# Patient Record
Sex: FEMALE | Race: BLACK OR AFRICAN AMERICAN | NOT HISPANIC OR LATINO
[De-identification: names, ages, dates, MRNs, and addresses within clinical notes are randomized per-mention and may not be internally consistent; named-entity substitution may affect disease eponyms.]

---

## 2021-02-12 PROBLEM — Z00.00 ENCOUNTER FOR PREVENTIVE HEALTH EXAMINATION: Status: ACTIVE | Noted: 2021-02-12

## 2021-02-23 ENCOUNTER — APPOINTMENT (OUTPATIENT)
Dept: ORTHOPEDIC SURGERY | Facility: CLINIC | Age: 76
End: 2021-02-23
Payer: COMMERCIAL

## 2021-02-23 VITALS
BODY MASS INDEX: 34.53 KG/M2 | OXYGEN SATURATION: 95 % | SYSTOLIC BLOOD PRESSURE: 136 MMHG | DIASTOLIC BLOOD PRESSURE: 80 MMHG | WEIGHT: 220 LBS | HEIGHT: 67 IN | HEART RATE: 96 BPM

## 2021-02-23 DIAGNOSIS — M25.562 PAIN IN RIGHT KNEE: ICD-10-CM

## 2021-02-23 DIAGNOSIS — Z86.39 PERSONAL HISTORY OF OTHER ENDOCRINE, NUTRITIONAL AND METABOLIC DISEASE: ICD-10-CM

## 2021-02-23 DIAGNOSIS — M25.561 PAIN IN RIGHT KNEE: ICD-10-CM

## 2021-02-23 PROCEDURE — 73564 X-RAY EXAM KNEE 4 OR MORE: CPT | Mod: LT

## 2021-02-23 PROCEDURE — 99205 OFFICE O/P NEW HI 60 MIN: CPT

## 2021-02-23 PROCEDURE — 99072 ADDL SUPL MATRL&STAF TM PHE: CPT

## 2021-02-23 RX ORDER — LEVOTHYROXINE SODIUM 100 UG/1
100 TABLET ORAL
Refills: 0 | Status: ACTIVE | COMMUNITY

## 2021-02-23 NOTE — DISCUSSION/SUMMARY
[de-identified] : The patient was informed of her findings and shown her x-rays along with her son who was present during this encounter.  She has end-stage degenerative arthritis bilateral knees.  She was advised that she is clearly a candidate for total knee arthroplasty given the severity of her degenerative changes on bilateral knee x-rays.  She understands her options and given her quality of life issues she is not interested in further anti-inflammatory medications therapy or injection treatments.\par \par Aside from being seen and evaluated for his knee, the patient underwent a lengthy face to face discussion pertaining to total knee arthroplasty. This included instructions and information about the pre-operative preparation as well as information pertaining to the hospitalization and post-operative care and rehabilitation.\par \par The patient was also made aware of the potential risks and possible complications as it pertains to total knee arthroplasty as well as the general surgical and anesthesia risks and complications. This includes, but is not limited to, possible wound infection, cardio-pulmonary problems such as DVT, fat embolism and PE (potentially fatal), soft tissue swelling, stiffness and fibrosis, skin slough, muscle or nerve injury, compartment syndrome, blood transfusion reaction/infection. Problems with the knee components include possible loosening or wearing-out which would require revision surgery.\par \par Patient specific instruments may also be utilized, if applicable, to help achieve more optimal implant alignment, customized to your unique knee anatomy. MRI (Magnetic Resonance Images) and X-Ray images of your affected leg are scanned into an advanced web-based software program, which will generate virtual images of your knee which are read and reviewed and ultimately approved by me. Surgical instruments and guides are then designed and built, mapping out specific bone cuts to accurately align the implants to your knee while performing the surgery. Time under anesthesia may be reduced, which may also lead to less blood loss and a lower risk of infection.\par \par Revision surgery or repeat arthrotomy may also be required for deep infections, and in extreme cases, the implants may be removed either temporarily (staged procedures) or permanently (resulting in knee fusion or even amputation in extreme cases). \par \par The patient understands the above discussion and has been given ample time to ask any other questions or address any concerns pertaining to the purposed surgery. They are also aware that our office staff, specifically our surgical coordinator will continue to be available to guide and instruct the patient during the perioperative phase, as well as answer or relay any other questions that may arise.  The patient will meet with Riri, our surgical coordinator accordingly\par \par Total patient encounter time >55 minutes\par

## 2021-02-23 NOTE — HISTORY OF PRESENT ILLNESS
[Worsening] : worsening [___ yrs] : [unfilled] year(s) ago [8] : a maximum pain level of 8/10 [Constant] : ~He/She~ states the symptoms seem to be constant [Walking] : worsened by walking [Knee Flexion] : worsened with knee flexion [Knee Extension] : worsened with knee extension [de-identified] : Pt presents for initial evaluation with pain in her bilateral knees worse  to the left knee. Pt states there is clicking no buckling. Pt has no known injury, she has taken Tylenol for pain prn with relief. Pt hx reveals arthroscopy to the right knee approx 5-8 years ago, and cortisone injection approx 3 weeks after the right knee surgery to the right knee.  Pt hx reveals Thyroid condition and is taking Synthroid. Pt seen by PCP Krysten Suero who ordered xray of her knees, xrays  2 views bilateral knees, taken [facility] 11/20/2020  Pt weight is 220lbs. [de-identified] : certain movement, ascending and descending stairs [de-identified] : Tylenol, stretching, topical eucalyptus

## 2021-02-23 NOTE — PHYSICAL EXAM
[de-identified] : Physical examination of both knees is essentially identical with limited range of motion from 5 to 95 degrees flexion with terminal tenderness.  Mild varus deformity to the left knee is noted on standing.  Mild effusions are present.  crepitus to the patellofemoral joints bilaterally. [de-identified] : X-rays taken of both knees in AP lateral skyline and open notch views disclose near bone-on-bone to the right knee and complete bone-on-bone joint space narrowing to the left knee.

## 2021-03-25 ENCOUNTER — APPOINTMENT (OUTPATIENT)
Dept: ORTHOPEDIC SURGERY | Facility: CLINIC | Age: 76
End: 2021-03-25
Payer: COMMERCIAL

## 2021-03-25 VITALS
BODY MASS INDEX: 36.1 KG/M2 | WEIGHT: 230 LBS | DIASTOLIC BLOOD PRESSURE: 87 MMHG | HEIGHT: 67 IN | HEART RATE: 67 BPM | SYSTOLIC BLOOD PRESSURE: 142 MMHG | TEMPERATURE: 97.4 F

## 2021-03-25 DIAGNOSIS — Z86.2 PERSONAL HISTORY OF DISEASES OF THE BLOOD AND BLOOD-FORMING ORGANS AND CERTAIN DISORDERS INVOLVING THE IMMUNE MECHANISM: ICD-10-CM

## 2021-03-25 DIAGNOSIS — M17.11 UNILATERAL PRIMARY OSTEOARTHRITIS, RIGHT KNEE: ICD-10-CM

## 2021-03-25 DIAGNOSIS — Z87.39 PERSONAL HISTORY OF OTHER DISEASES OF THE MUSCULOSKELETAL SYSTEM AND CONNECTIVE TISSUE: ICD-10-CM

## 2021-03-25 DIAGNOSIS — Z72.3 LACK OF PHYSICAL EXERCISE: ICD-10-CM

## 2021-03-25 PROCEDURE — 99072 ADDL SUPL MATRL&STAF TM PHE: CPT

## 2021-03-25 PROCEDURE — 99214 OFFICE O/P EST MOD 30 MIN: CPT

## 2021-04-26 ENCOUNTER — TRANSCRIPTION ENCOUNTER (OUTPATIENT)
Age: 76
End: 2021-04-26

## 2021-04-28 ENCOUNTER — NON-APPOINTMENT (OUTPATIENT)
Age: 76
End: 2021-04-28

## 2021-04-28 ENCOUNTER — APPOINTMENT (OUTPATIENT)
Dept: CARDIOLOGY | Facility: CLINIC | Age: 76
End: 2021-04-28
Payer: COMMERCIAL

## 2021-04-28 VITALS
SYSTOLIC BLOOD PRESSURE: 134 MMHG | HEIGHT: 67 IN | BODY MASS INDEX: 36.1 KG/M2 | DIASTOLIC BLOOD PRESSURE: 78 MMHG | TEMPERATURE: 97.3 F | OXYGEN SATURATION: 95 % | WEIGHT: 230 LBS | HEART RATE: 77 BPM

## 2021-04-28 DIAGNOSIS — R06.02 SHORTNESS OF BREATH: ICD-10-CM

## 2021-04-28 PROCEDURE — 93306 TTE W/DOPPLER COMPLETE: CPT

## 2021-04-28 PROCEDURE — 93000 ELECTROCARDIOGRAM COMPLETE: CPT

## 2021-04-28 PROCEDURE — 99204 OFFICE O/P NEW MOD 45 MIN: CPT

## 2021-04-28 PROCEDURE — 99072 ADDL SUPL MATRL&STAF TM PHE: CPT

## 2021-04-28 NOTE — DISCUSSION/SUMMARY
[FreeTextEntry1] : In a summary Aviva Moffett is an elderly female with Abnormal EKG showing anterolateral T wave inversions , shortness of breath on exertion and pre op cardiac evaluation, echo done showed normal LV systolic function and wall motion. Pharmacological nuclear stress test to rule out ischemia. Further plan based on stress test results. Explained Ms. Taylor about stress test. Also called [facility] Nuclear lab and made appointment for May 12 th.

## 2021-04-28 NOTE — HISTORY OF PRESENT ILLNESS
[FreeTextEntry1] : Aviva Moffett is a 75 year old female with history of hypothyroidism and abnormal EKG comes for pre op cardiac evaluation for right knee replacement . Denies any chest pain or palpitations. Mild chronic shortness of breath on exertion which is relieved with rest in few minutes. Can not assess functional capacity as she can not walk much because of joint pains.. Walks with Cane.

## 2021-04-28 NOTE — REVIEW OF SYSTEMS
[Dyspnea on exertion] : dyspnea during exertion [Joint Pain] : joint pain [Negative] : Psychiatric [Chest Discomfort] : no chest discomfort [Lower Ext Edema] : no extremity edema [Leg Claudication] : no intermittent leg claudication [Palpitations] : no palpitations [Orthopnea] : no orthopnea [PND] : no PND [Syncope] : no syncope [Easy Bleeding] : no tendency for easy bleeding [Easy Bruising] : no tendency for easy bruising

## 2021-04-28 NOTE — PHYSICAL EXAM
[No Carotid Bruit] : no carotid bruit [No Edema] : no edema [No Cyanosis] : no cyanosis [No Clubbing] : no clubbing [Normal] : alert and oriented, normal memory [de-identified] : walks with cane.

## 2021-05-05 ENCOUNTER — OUTPATIENT (OUTPATIENT)
Dept: OUTPATIENT SERVICES | Facility: HOSPITAL | Age: 76
LOS: 1 days | End: 2021-05-05
Payer: COMMERCIAL

## 2021-05-05 VITALS
RESPIRATION RATE: 16 BRPM | SYSTOLIC BLOOD PRESSURE: 144 MMHG | WEIGHT: 225.09 LBS | TEMPERATURE: 97 F | OXYGEN SATURATION: 96 % | HEIGHT: 66 IN | DIASTOLIC BLOOD PRESSURE: 86 MMHG | HEART RATE: 74 BPM

## 2021-05-05 DIAGNOSIS — M17.11 UNILATERAL PRIMARY OSTEOARTHRITIS, RIGHT KNEE: ICD-10-CM

## 2021-05-05 DIAGNOSIS — Z98.890 OTHER SPECIFIED POSTPROCEDURAL STATES: Chronic | ICD-10-CM

## 2021-05-05 DIAGNOSIS — Z01.818 ENCOUNTER FOR OTHER PREPROCEDURAL EXAMINATION: ICD-10-CM

## 2021-05-05 LAB
ALBUMIN SERPL ELPH-MCNC: 3.7 G/DL — SIGNIFICANT CHANGE UP (ref 3.3–5)
ALP SERPL-CCNC: 63 U/L — SIGNIFICANT CHANGE UP (ref 30–120)
ALT FLD-CCNC: 28 U/L DA — SIGNIFICANT CHANGE UP (ref 10–60)
ANION GAP SERPL CALC-SCNC: 7 MMOL/L — SIGNIFICANT CHANGE UP (ref 5–17)
APTT BLD: 33.9 SEC — SIGNIFICANT CHANGE UP (ref 27.5–35.5)
AST SERPL-CCNC: 22 U/L — SIGNIFICANT CHANGE UP (ref 10–40)
BILIRUB SERPL-MCNC: 0.4 MG/DL — SIGNIFICANT CHANGE UP (ref 0.2–1.2)
BLD GP AB SCN SERPL QL: SIGNIFICANT CHANGE UP
BUN SERPL-MCNC: 16 MG/DL — SIGNIFICANT CHANGE UP (ref 7–23)
CALCIUM SERPL-MCNC: 9.1 MG/DL — SIGNIFICANT CHANGE UP (ref 8.4–10.5)
CHLORIDE SERPL-SCNC: 106 MMOL/L — SIGNIFICANT CHANGE UP (ref 96–108)
CO2 SERPL-SCNC: 29 MMOL/L — SIGNIFICANT CHANGE UP (ref 22–31)
CREAT SERPL-MCNC: 1.1 MG/DL — SIGNIFICANT CHANGE UP (ref 0.5–1.3)
GLUCOSE SERPL-MCNC: 92 MG/DL — SIGNIFICANT CHANGE UP (ref 70–99)
HCT VFR BLD CALC: 38.9 % — SIGNIFICANT CHANGE UP (ref 34.5–45)
HGB BLD-MCNC: 12.8 G/DL — SIGNIFICANT CHANGE UP (ref 11.5–15.5)
INR BLD: 1.03 RATIO — SIGNIFICANT CHANGE UP (ref 0.88–1.16)
MCHC RBC-ENTMCNC: 29.2 PG — SIGNIFICANT CHANGE UP (ref 27–34)
MCHC RBC-ENTMCNC: 32.9 GM/DL — SIGNIFICANT CHANGE UP (ref 32–36)
MCV RBC AUTO: 88.8 FL — SIGNIFICANT CHANGE UP (ref 80–100)
NRBC # BLD: 0 /100 WBCS — SIGNIFICANT CHANGE UP (ref 0–0)
PLATELET # BLD AUTO: 190 K/UL — SIGNIFICANT CHANGE UP (ref 150–400)
POTASSIUM SERPL-MCNC: 4.2 MMOL/L — SIGNIFICANT CHANGE UP (ref 3.5–5.3)
POTASSIUM SERPL-SCNC: 4.2 MMOL/L — SIGNIFICANT CHANGE UP (ref 3.5–5.3)
PROT SERPL-MCNC: 7.5 G/DL — SIGNIFICANT CHANGE UP (ref 6–8.3)
PROTHROM AB SERPL-ACNC: 12.5 SEC — SIGNIFICANT CHANGE UP (ref 10.6–13.6)
RBC # BLD: 4.38 M/UL — SIGNIFICANT CHANGE UP (ref 3.8–5.2)
RBC # FLD: 13.4 % — SIGNIFICANT CHANGE UP (ref 10.3–14.5)
SODIUM SERPL-SCNC: 142 MMOL/L — SIGNIFICANT CHANGE UP (ref 135–145)
WBC # BLD: 5.16 K/UL — SIGNIFICANT CHANGE UP (ref 3.8–10.5)
WBC # FLD AUTO: 5.16 K/UL — SIGNIFICANT CHANGE UP (ref 3.8–10.5)

## 2021-05-05 PROCEDURE — G0463: CPT

## 2021-05-05 NOTE — H&P PST ADULT - NSICDXFAMILYHX_GEN_ALL_CORE_FT
FAMILY HISTORY:  Father  Still living? No  Family history of cardiac arrest, Age at diagnosis: Age Unknown

## 2021-05-05 NOTE — H&P PST ADULT - NSICDXPASTMEDICALHX_GEN_ALL_CORE_FT
PAST MEDICAL HISTORY:  Class 2 obesity with body mass index (BMI) of 36.0 to 36.9 in adult     COVID-19 vaccine series completed 3/21    Hypothyroidism     Osteoarthritis of right knee     Sickle cell trait

## 2021-05-05 NOTE — H&P PST ADULT - MUSCULOSKELETAL
detailed exam details… no joint erythema/no joint warmth/no calf tenderness/decreased ROM due to pain/joint swelling

## 2021-05-05 NOTE — H&P PST ADULT - NSANTHOSAYNRD_GEN_A_CORE
No. HUNTER screening performed.  STOP BANG Legend: 0-2 = LOW Risk; 3-4 = INTERMEDIATE Risk; 5-8 = HIGH Risk

## 2021-05-05 NOTE — H&P PST ADULT - HISTORY OF PRESENT ILLNESS
76 yo female reports right knee pain which is constant rating 10/10.   She is scheduled for right total knee replacement on 5/19/21 at [facility].

## 2021-05-05 NOTE — H&P PST ADULT - NSICDXPASTSURGICALHX_GEN_ALL_CORE_FT
PAST SURGICAL HISTORY:  History of arthroscopy of knee right, 2010    History of lithotripsy 2000

## 2021-05-05 NOTE — H&P PST ADULT - NSICDXPROBLEM_GEN_ALL_CORE_FT
PROBLEM DIAGNOSES  Problem: Osteoarthritis of right knee  Assessment and Plan: Right total knee replacement is planned for 5/19/2021.  Diagnostic testing performed; medical and cardiac clearances requested. COVID PCR testing is scheduled for 5/16/2021 @ [facility].  pre op instructions were reviewed and signed.  Contact info given; best wishes offered.

## 2021-05-06 LAB
MRSA PCR RESULT.: SIGNIFICANT CHANGE UP
S AUREUS DNA NOSE QL NAA+PROBE: SIGNIFICANT CHANGE UP

## 2021-05-12 ENCOUNTER — OUTPATIENT (OUTPATIENT)
Dept: OUTPATIENT SERVICES | Facility: HOSPITAL | Age: 76
LOS: 1 days | End: 2021-05-12

## 2021-05-12 ENCOUNTER — APPOINTMENT (OUTPATIENT)
Dept: CV DIAGNOSTICS | Facility: HOSPITAL | Age: 76
End: 2021-05-12
Payer: COMMERCIAL

## 2021-05-12 DIAGNOSIS — Z98.890 OTHER SPECIFIED POSTPROCEDURAL STATES: Chronic | ICD-10-CM

## 2021-05-12 DIAGNOSIS — Z01.810 ENCOUNTER FOR PREPROCEDURAL CARDIOVASCULAR EXAMINATION: ICD-10-CM

## 2021-05-12 PROBLEM — E03.9 HYPOTHYROIDISM, UNSPECIFIED: Chronic | Status: ACTIVE | Noted: 2021-05-05

## 2021-05-12 PROBLEM — E66.9 OBESITY, UNSPECIFIED: Chronic | Status: ACTIVE | Noted: 2021-05-05

## 2021-05-12 PROBLEM — D57.3 SICKLE-CELL TRAIT: Chronic | Status: ACTIVE | Noted: 2021-05-05

## 2021-05-12 PROCEDURE — 93018 CV STRESS TEST I&R ONLY: CPT | Mod: GC

## 2021-05-12 PROCEDURE — 93016 CV STRESS TEST SUPVJ ONLY: CPT | Mod: GC

## 2021-05-12 PROCEDURE — 78452 HT MUSCLE IMAGE SPECT MULT: CPT | Mod: 26

## 2021-05-13 ENCOUNTER — APPOINTMENT (OUTPATIENT)
Dept: ORTHOPEDIC SURGERY | Facility: HOSPITAL | Age: 76
End: 2021-05-13

## 2021-05-14 NOTE — H&P PST ADULT - BP NONINVASIVE DIASTOLIC (MM HG)
You can access the FollowMyHealth Patient Portal offered by [facility] by registering at the following website: [email or web address]. By joining [employer]’s FollowMyHealth portal, you will also be able to view your health information using other applications (apps) compatible with our system.
86

## 2021-05-16 ENCOUNTER — APPOINTMENT (OUTPATIENT)
Dept: DISASTER EMERGENCY | Facility: CLINIC | Age: 76
End: 2021-05-16

## 2021-05-16 DIAGNOSIS — Z01.818 ENCOUNTER FOR OTHER PREPROCEDURAL EXAMINATION: ICD-10-CM

## 2021-05-17 ENCOUNTER — NON-APPOINTMENT (OUTPATIENT)
Age: 76
End: 2021-05-17

## 2021-05-17 LAB — SARS-COV-2 N GENE NPH QL NAA+PROBE: NOT DETECTED

## 2021-05-18 ENCOUNTER — FORM ENCOUNTER (OUTPATIENT)
Age: 76
End: 2021-05-18

## 2021-05-19 ENCOUNTER — INPATIENT (INPATIENT)
Facility: HOSPITAL | Age: 76
LOS: 0 days | Discharge: HOME | DRG: 470 | End: 2021-05-20
Attending: ORTHOPAEDIC SURGERY | Admitting: ORTHOPAEDIC SURGERY
Payer: MEDICARE

## 2021-05-19 ENCOUNTER — RESULT REVIEW (OUTPATIENT)
Age: 76
End: 2021-05-19

## 2021-05-19 ENCOUNTER — TRANSCRIPTION ENCOUNTER (OUTPATIENT)
Age: 76
End: 2021-05-19

## 2021-05-19 ENCOUNTER — APPOINTMENT (OUTPATIENT)
Dept: ORTHOPEDIC SURGERY | Facility: HOSPITAL | Age: 76
End: 2021-05-19

## 2021-05-19 VITALS
TEMPERATURE: 98 F | WEIGHT: 227.96 LBS | DIASTOLIC BLOOD PRESSURE: 80 MMHG | OXYGEN SATURATION: 98 % | HEART RATE: 79 BPM | HEIGHT: 67 IN | RESPIRATION RATE: 11 BRPM | SYSTOLIC BLOOD PRESSURE: 149 MMHG

## 2021-05-19 DIAGNOSIS — Z98.890 OTHER SPECIFIED POSTPROCEDURAL STATES: Chronic | ICD-10-CM

## 2021-05-19 DIAGNOSIS — M17.11 UNILATERAL PRIMARY OSTEOARTHRITIS, RIGHT KNEE: ICD-10-CM

## 2021-05-19 LAB
ANION GAP SERPL CALC-SCNC: 7 MMOL/L — SIGNIFICANT CHANGE UP (ref 5–17)
BUN SERPL-MCNC: 14 MG/DL — SIGNIFICANT CHANGE UP (ref 7–23)
CALCIUM SERPL-MCNC: 8.6 MG/DL — SIGNIFICANT CHANGE UP (ref 8.4–10.5)
CHLORIDE SERPL-SCNC: 105 MMOL/L — SIGNIFICANT CHANGE UP (ref 96–108)
CO2 SERPL-SCNC: 27 MMOL/L — SIGNIFICANT CHANGE UP (ref 22–31)
CREAT SERPL-MCNC: 1.23 MG/DL — SIGNIFICANT CHANGE UP (ref 0.5–1.3)
GLUCOSE SERPL-MCNC: 161 MG/DL — HIGH (ref 70–99)
HCT VFR BLD CALC: 34.8 % — SIGNIFICANT CHANGE UP (ref 34.5–45)
HGB BLD-MCNC: 11.5 G/DL — SIGNIFICANT CHANGE UP (ref 11.5–15.5)
MCHC RBC-ENTMCNC: 29.3 PG — SIGNIFICANT CHANGE UP (ref 27–34)
MCHC RBC-ENTMCNC: 33 GM/DL — SIGNIFICANT CHANGE UP (ref 32–36)
MCV RBC AUTO: 88.8 FL — SIGNIFICANT CHANGE UP (ref 80–100)
NRBC # BLD: 0 /100 WBCS — SIGNIFICANT CHANGE UP (ref 0–0)
PLATELET # BLD AUTO: 167 K/UL — SIGNIFICANT CHANGE UP (ref 150–400)
POTASSIUM SERPL-MCNC: 4.4 MMOL/L — SIGNIFICANT CHANGE UP (ref 3.5–5.3)
POTASSIUM SERPL-SCNC: 4.4 MMOL/L — SIGNIFICANT CHANGE UP (ref 3.5–5.3)
RBC # BLD: 3.92 M/UL — SIGNIFICANT CHANGE UP (ref 3.8–5.2)
RBC # FLD: 13.7 % — SIGNIFICANT CHANGE UP (ref 10.3–14.5)
SODIUM SERPL-SCNC: 139 MMOL/L — SIGNIFICANT CHANGE UP (ref 135–145)
WBC # BLD: 6.13 K/UL — SIGNIFICANT CHANGE UP (ref 3.8–10.5)
WBC # FLD AUTO: 6.13 K/UL — SIGNIFICANT CHANGE UP (ref 3.8–10.5)

## 2021-05-19 PROCEDURE — 73560 X-RAY EXAM OF KNEE 1 OR 2: CPT | Mod: 26,RT

## 2021-05-19 PROCEDURE — 88305 TISSUE EXAM BY PATHOLOGIST: CPT | Mod: 26

## 2021-05-19 PROCEDURE — 99223 1ST HOSP IP/OBS HIGH 75: CPT

## 2021-05-19 PROCEDURE — 88311 DECALCIFY TISSUE: CPT | Mod: 26

## 2021-05-19 PROCEDURE — 27447 TOTAL KNEE ARTHROPLASTY: CPT | Mod: RT

## 2021-05-19 RX ORDER — SODIUM CHLORIDE 9 MG/ML
1000 INJECTION, SOLUTION INTRAVENOUS
Refills: 0 | Status: DISCONTINUED | OUTPATIENT
Start: 2021-05-19 | End: 2021-05-19

## 2021-05-19 RX ORDER — OXYCODONE HYDROCHLORIDE 5 MG/1
5 TABLET ORAL
Refills: 0 | Status: DISCONTINUED | OUTPATIENT
Start: 2021-05-19 | End: 2021-05-20

## 2021-05-19 RX ORDER — ACETAMINOPHEN 500 MG
1000 TABLET ORAL ONCE
Refills: 0 | Status: COMPLETED | OUTPATIENT
Start: 2021-05-19 | End: 2021-05-19

## 2021-05-19 RX ORDER — TRANEXAMIC ACID 100 MG/ML
1000 INJECTION, SOLUTION INTRAVENOUS ONCE
Refills: 0 | Status: COMPLETED | OUTPATIENT
Start: 2021-05-19 | End: 2021-05-19

## 2021-05-19 RX ORDER — SODIUM CHLORIDE 9 MG/ML
500 INJECTION, SOLUTION INTRAVENOUS ONCE
Refills: 0 | Status: COMPLETED | OUTPATIENT
Start: 2021-05-19 | End: 2021-05-19

## 2021-05-19 RX ORDER — ASPIRIN/CALCIUM CARB/MAGNESIUM 324 MG
1 TABLET ORAL
Qty: 83 | Refills: 0
Start: 2021-05-19 | End: 2021-06-29

## 2021-05-19 RX ORDER — LEVOTHYROXINE SODIUM 125 MCG
100 TABLET ORAL DAILY
Refills: 0 | Status: DISCONTINUED | OUTPATIENT
Start: 2021-05-19 | End: 2021-05-20

## 2021-05-19 RX ORDER — ONDANSETRON 8 MG/1
4 TABLET, FILM COATED ORAL ONCE
Refills: 0 | Status: DISCONTINUED | OUTPATIENT
Start: 2021-05-19 | End: 2021-05-19

## 2021-05-19 RX ORDER — ACETAMINOPHEN 500 MG
1000 TABLET ORAL ONCE
Refills: 0 | Status: COMPLETED | OUTPATIENT
Start: 2021-05-20 | End: 2021-05-20

## 2021-05-19 RX ORDER — ACETAMINOPHEN 500 MG
1000 TABLET ORAL EVERY 8 HOURS
Refills: 0 | Status: DISCONTINUED | OUTPATIENT
Start: 2021-05-20 | End: 2021-05-20

## 2021-05-19 RX ORDER — CEFAZOLIN SODIUM 1 G
2000 VIAL (EA) INJECTION ONCE
Refills: 0 | Status: COMPLETED | OUTPATIENT
Start: 2021-05-19 | End: 2021-05-19

## 2021-05-19 RX ORDER — OMEPRAZOLE 10 MG/1
1 CAPSULE, DELAYED RELEASE ORAL
Qty: 30 | Refills: 1
Start: 2021-05-19 | End: 2021-07-17

## 2021-05-19 RX ORDER — HYDROMORPHONE HYDROCHLORIDE 2 MG/ML
0.5 INJECTION INTRAMUSCULAR; INTRAVENOUS; SUBCUTANEOUS
Refills: 0 | Status: DISCONTINUED | OUTPATIENT
Start: 2021-05-19 | End: 2021-05-20

## 2021-05-19 RX ORDER — ASPIRIN/CALCIUM CARB/MAGNESIUM 324 MG
81 TABLET ORAL
Refills: 0 | Status: DISCONTINUED | OUTPATIENT
Start: 2021-05-20 | End: 2021-05-20

## 2021-05-19 RX ORDER — PANTOPRAZOLE SODIUM 20 MG/1
40 TABLET, DELAYED RELEASE ORAL
Refills: 0 | Status: DISCONTINUED | OUTPATIENT
Start: 2021-05-19 | End: 2021-05-20

## 2021-05-19 RX ORDER — CELECOXIB 200 MG/1
1 CAPSULE ORAL
Qty: 60 | Refills: 0
Start: 2021-05-19 | End: 2021-06-17

## 2021-05-19 RX ORDER — SODIUM CHLORIDE 9 MG/ML
500 INJECTION INTRAMUSCULAR; INTRAVENOUS; SUBCUTANEOUS ONCE
Refills: 0 | Status: COMPLETED | OUTPATIENT
Start: 2021-05-19 | End: 2021-05-19

## 2021-05-19 RX ORDER — CELECOXIB 200 MG/1
200 CAPSULE ORAL EVERY 12 HOURS
Refills: 0 | Status: DISCONTINUED | OUTPATIENT
Start: 2021-05-20 | End: 2021-05-20

## 2021-05-19 RX ORDER — CEFAZOLIN SODIUM 1 G
2000 VIAL (EA) INJECTION EVERY 8 HOURS
Refills: 0 | Status: COMPLETED | OUTPATIENT
Start: 2021-05-19 | End: 2021-05-20

## 2021-05-19 RX ORDER — MAGNESIUM HYDROXIDE 400 MG/1
30 TABLET, CHEWABLE ORAL DAILY
Refills: 0 | Status: DISCONTINUED | OUTPATIENT
Start: 2021-05-19 | End: 2021-05-20

## 2021-05-19 RX ORDER — SODIUM CHLORIDE 9 MG/ML
1000 INJECTION, SOLUTION INTRAVENOUS
Refills: 0 | Status: DISCONTINUED | OUTPATIENT
Start: 2021-05-20 | End: 2021-05-20

## 2021-05-19 RX ORDER — ONDANSETRON 8 MG/1
4 TABLET, FILM COATED ORAL EVERY 6 HOURS
Refills: 0 | Status: DISCONTINUED | OUTPATIENT
Start: 2021-05-19 | End: 2021-05-20

## 2021-05-19 RX ORDER — OXYCODONE HYDROCHLORIDE 5 MG/1
10 TABLET ORAL
Refills: 0 | Status: DISCONTINUED | OUTPATIENT
Start: 2021-05-19 | End: 2021-05-20

## 2021-05-19 RX ORDER — APREPITANT 80 MG/1
40 CAPSULE ORAL ONCE
Refills: 0 | Status: COMPLETED | OUTPATIENT
Start: 2021-05-19 | End: 2021-05-19

## 2021-05-19 RX ORDER — HYDROMORPHONE HYDROCHLORIDE 2 MG/ML
0.5 INJECTION INTRAMUSCULAR; INTRAVENOUS; SUBCUTANEOUS
Refills: 0 | Status: DISCONTINUED | OUTPATIENT
Start: 2021-05-19 | End: 2021-05-19

## 2021-05-19 RX ORDER — CHLORHEXIDINE GLUCONATE 213 G/1000ML
1 SOLUTION TOPICAL ONCE
Refills: 0 | Status: COMPLETED | OUTPATIENT
Start: 2021-05-19 | End: 2021-05-19

## 2021-05-19 RX ADMIN — CHLORHEXIDINE GLUCONATE 1 APPLICATION(S): 213 SOLUTION TOPICAL at 06:49

## 2021-05-19 RX ADMIN — SODIUM CHLORIDE 100 MILLILITER(S): 9 INJECTION, SOLUTION INTRAVENOUS at 12:17

## 2021-05-19 RX ADMIN — Medication 1000 MILLIGRAM(S): at 23:14

## 2021-05-19 RX ADMIN — Medication 400 MILLIGRAM(S): at 17:09

## 2021-05-19 RX ADMIN — SODIUM CHLORIDE 500 MILLILITER(S): 9 INJECTION INTRAMUSCULAR; INTRAVENOUS; SUBCUTANEOUS at 12:00

## 2021-05-19 RX ADMIN — Medication 1000 MILLIGRAM(S): at 17:09

## 2021-05-19 RX ADMIN — Medication 400 MILLIGRAM(S): at 23:14

## 2021-05-19 RX ADMIN — Medication 1000 MILLIGRAM(S): at 12:20

## 2021-05-19 RX ADMIN — OXYCODONE HYDROCHLORIDE 5 MILLIGRAM(S): 5 TABLET ORAL at 13:57

## 2021-05-19 RX ADMIN — Medication 100 MILLIGRAM(S): at 16:52

## 2021-05-19 RX ADMIN — APREPITANT 40 MILLIGRAM(S): 80 CAPSULE ORAL at 06:49

## 2021-05-19 RX ADMIN — SODIUM CHLORIDE 500 MILLILITER(S): 9 INJECTION, SOLUTION INTRAVENOUS at 14:02

## 2021-05-19 RX ADMIN — Medication 400 MILLIGRAM(S): at 11:50

## 2021-05-19 RX ADMIN — OXYCODONE HYDROCHLORIDE 5 MILLIGRAM(S): 5 TABLET ORAL at 14:30

## 2021-05-19 NOTE — PHYSICAL THERAPY INITIAL EVALUATION ADULT - AMBULATION SKILLS, REHAB EVAL
Referral for Behavioral Health/Glenna Méndez has been placed.   Patient will be contacted by [facility] to schedule.  
straight cane/independent/needs device

## 2021-05-19 NOTE — DISCHARGE NOTE NURSING/CASE MANAGEMENT/SOCIAL WORK - PATIENT PORTAL LINK FT
You can access the FollowMyHealth Patient Portal offered by [facility] by registering at the following website: [email or web address]. By joining [employer]’s FollowMyHealth portal, you will also be able to view your health information using other applications (apps) compatible with our system.

## 2021-05-19 NOTE — PHYSICAL THERAPY INITIAL EVALUATION ADULT - RANGE OF MOTION EXAMINATION, REHAB EVAL
Right knee flexion gross 60 degrees/bilateral upper extremity ROM was WFL (within functional limits)/Left LE ROM was WFL (within functional limits)/deficits as listed below

## 2021-05-19 NOTE — CONSULT NOTE ADULT - ASSESSMENT
POD#0 s/p RIGHT TKR  - Pain control  - Bowel regimen  - PT/OT  - Incentive spirometry  - VTE PPx - ASA BID    Abnormal pre-op EKG  - cardio clearance reviewed  - had nuclear stress test on 5/12/21, normal study, no ischemia    Hypothyroidism  - continue synthroid    Obesity  - diet/portion control  - exercise program once fully rehabbed from current procedure    Sickle Cell Train  - not anemic at baseline

## 2021-05-19 NOTE — DISCHARGE NOTE NURSING/CASE MANAGEMENT/SOCIAL WORK - NSSCNAMETXT_GEN_ALL_CORE
[facility] [place] - [phone] or [phone]  Nurse to visit the day after hospital discharge; physical therapist to follow. Please contact the home care agency if you have not heard from them by 12 noon on the day after your hospital discharge.   [facility]  PHYSICAL THERAPIST to visit the day after hospital discharge; Nurse to follow during week one. Please contact the home care agency if you have not heard from them by 12 noon on the day after your hospital discharge.

## 2021-05-19 NOTE — DISCHARGE NOTE PROVIDER - NSDCMRMEDTOKEN_GEN_ALL_CORE_FT
aspirin 81 mg oral delayed release tablet: 1 tab orally every 12 hours. Take 2 hours before Celebrex.   celecoxib 200 mg oral capsule: 1 cap orally every 12 hours. Take 2 hours after Aspirin.  omeprazole 20 mg oral delayed release capsule: 1 cap orally once a day   Synthroid 100 mcg (0.1 mg) oral tablet: 1 tab(s) orally once a day   acetaminophen 500 mg oral tablet: 2 tab(s) orally every 8 hours  aspirin 81 mg oral delayed release tablet: 1 tab orally every 12 hours. Take 2 hours before Celebrex.   celecoxib 200 mg oral capsule: 1 cap orally every 12 hours. Take 2 hours after Aspirin.  omeprazole 20 mg oral delayed release capsule: 1 cap orally once a day   oxyCODONE 5 mg oral tablet: 1-2 tab(s) orally every 4 hours, As Needed -for severe pain MDD:6  Synthroid 100 mcg (0.1 mg) oral tablet: 1 tab(s) orally once a day

## 2021-05-19 NOTE — OCCUPATIONAL THERAPY INITIAL EVALUATION ADULT - ADDITIONAL COMMENTS
Patient lives in private home with  and son. 3 LAUREN. Patient owns RW, RTS. +tub with grab bars. Patient used a SAC prior to surgery, and was independent with ADLs.

## 2021-05-19 NOTE — DISCHARGE NOTE PROVIDER - CARE PROVIDER_API CALL
Reji Robert)  Orthopaedic Surgery  [address], [address]  [place] [address]  Phone: [phone]  Fax: [phone]  Established Patient  Scheduled Appointment: 06/03/2021 09:30 AM

## 2021-05-19 NOTE — DISCHARGE NOTE PROVIDER - NSDCCPCAREPLAN_GEN_ALL_CORE_FT
PRINCIPAL DISCHARGE DIAGNOSIS  Diagnosis: Primary osteoarthritis of right knee  Assessment and Plan of Treatment: For your total knee replacement:  Physical Therapy/Occupational Therapy for: ambulation, transfers, stairs, ADL's (activities of daily living), range of motion exercises, and isometrics  -Activity  • Weight Bearing as tolerated with rolling walker.  • Ice 20 minutes several times daily with at least a 20 minute break in between icing sessions  • Take short, frequent walks increasing the distance that you walk each day as tolerated.  • Change your position every hour to decrease pain and stiffness.  • Continue the exercises taught to you by your physical therapist.  • No driving until cleared by the doctor.  • No tub baths, hot tubs, or swimming pools until instructed by your doctor.  • Do not squat down on the floor.  • Do not kneel or twist your knee.  • Range of Motion Goals: Flexion= 120 degrees, Extension = 0 degrees  Daily dressing changes if incision is not completely dry.  If inicision is dry, it may be left open to air.  Keep incision clean. DO NOT APPLY ANYTHING to incision site (salves/ointments/creams).  May shower post-op if no drainage from incision.  Do not scrub incision site. Pat dry after shower.  Suture removal 2 weeks after surgery at Surgeon's office.

## 2021-05-19 NOTE — PHYSICAL THERAPY INITIAL EVALUATION ADULT - ADDITIONAL COMMENTS
Pt lives in a house w/  and son w/ 3 w/o a handrail steps to enter and no steps to the bedroom. Pt reports owning a straight cane and rolling walker.

## 2021-05-19 NOTE — DISCHARGE NOTE PROVIDER - PROVIDER TOKENS
PROVIDER:[TOKEN:[[id number]],SCHEDULEDAPPT:[06/03/2021],SCHEDULEDAPPTTIME:[09:30 AM],ESTABLISHEDPATIENT:[T]]

## 2021-05-19 NOTE — PROGRESS NOTE ADULT - SUBJECTIVE AND OBJECTIVE BOX
Ortho PA - Post Op Check - S/P Right TKR with adductor canal nerve block and general anesthesia           Pt alert and c/o moderate pain in upper right thigh.  Denies nausea.    Vital Signs Last 24 Hrs  T(C): 36.8 (05-19-21 @ 11:40), Max: 36.8 (05-19-21 @ 11:40)  T(F): 98.2 (05-19-21 @ 11:40), Max: 98.2 (05-19-21 @ 11:40)  HR: 68 (05-19-21 @ 13:24) (62 - 79)  BP: 120/64 (05-19-21 @ 13:24) (98/58 - 141/59)  BP(mean): --  RR: 18 (05-19-21 @ 13:24) (10 - 18)  SpO2: 95% (05-19-21 @ 13:24) (95% - 100%)  I&O's Detail    19 May 2021 07:01  -  19 May 2021 14:30  --------------------------------------------------------  IN:    IV PiggyBack: 100 mL    Lactated Ringers: 1500 mL    Lactated Ringers: 100 mL    Sodium Chloride 0.9% Bolus: 500 mL  Total IN: 2200 mL    OUT:    Estimated Blood Loss (mL): 75 mL in OR    Voided (mL): 400 mL  Total OUT: 475 mL    Total NET: 1725 mL        I&O's Summary    19 May 2021 07:01  -  19 May 2021 14:30  --------------------------------------------------------  IN: 2200 mL / OUT: 475 mL / NET: 1725 mL                     MEDICATIONS:acetaminophen  IVPB .. 1000 milliGRAM(s) IV Intermittent once  acetaminophen  IVPB .. 1000 milliGRAM(s) IV Intermittent once  ceFAZolin   IVPB 2000 milliGRAM(s) IV Intermittent every 8 hours  HYDROmorphone  Injectable 0.5 milliGRAM(s) IV Push every 3 hours PRN  levothyroxine 100 MICROGram(s) Oral daily  magnesium hydroxide Suspension 30 milliLiter(s) Oral daily PRN  ondansetron Injectable 4 milliGRAM(s) IV Push every 6 hours PRN  oxyCODONE    IR 5 milliGRAM(s) Oral every 3 hours PRN  oxyCODONE    IR 10 milliGRAM(s) Oral every 3 hours PRN  pantoprazole    Tablet 40 milliGRAM(s) Oral before breakfast    Anticoagulation: PAS to LE's      Antibiotics:   ceFAZolin   IVPB 2000 milliGRAM(s) IV Intermittent every 8 hours for 2 more doses postop      Pain medications:   acetaminophen  IVPB .. 1000 milliGRAM(s) IV Intermittent once  acetaminophen  IVPB .. 1000 milliGRAM(s) IV Intermittent once  HYDROmorphone  Injectable 0.5 milliGRAM(s) IV Push every 3 hours PRN  ondansetron Injectable 4 milliGRAM(s) IV Push every 6 hours PRN  oxyCODONE    IR 5 milliGRAM(s) Oral every 3 hours PRN  oxyCODONE    IR 10 milliGRAM(s) Oral every 3 hours PRN          PE:  Right Knee-primary surgical bandage dry and intact.   Feet mobile and sensate.  *EHLs/ant.tibs. 5/5. DP pulse 1+.  PAS on LE's.  Calves soft and nontender.    A/P: Ortho stable post-op  - Continue post-op orders; pain management with above plan  - Check labs today and in A.M.  - DVT prevention with 6 weeks of Aspirin starting tomorrow.  - PT /OT for OOB, full WBAT  - Medical consult with Dr. Mccoy appreciated.  -Discharge planning for Home with home care services tomorrow.  -Will continue to monitor closely with attendings.

## 2021-05-19 NOTE — DISCHARGE NOTE PROVIDER - HOSPITAL COURSE
This patient was admitted to [facility] with a history of severe degenerative joint disease of the right knee.  Patient underwent Pre-Surgical Testing and was medically cleared to undergo elective procedure. Patient underwent right TKR by Dr. Reji Robert on 5/19/21. Procedure was well tolerated.  No operative or ramandeep-operative complications arose during patient's hospital course.  Patient received antibiotic according to SCIP guidelines for infection prevention.  Aspirin 81mg q 12h was given for DVT prophylaxis, in addition to the use of SCDs.  Anesthesia, Medical Hospitalist, Physical Therapy and Occupational Therapy were consulted. Patient is stable for discharge with a good prognosis.  Appropriate discharge instructions and medications are provided in this document.

## 2021-05-19 NOTE — CONSULT NOTE ADULT - SUBJECTIVE AND OBJECTIVE BOX
Information Obtained from:  EHR, Physical Chart, Patient at bedside (relevant EHR and Chart information verified with patient)    HPI:  74yo F admitted s/p RIGHT TKR today.  Has had worsening right knee pain, constant, up to 10/10, dull, achy, non-radiating, worse with activity, better with weight offloading, affecting quality of life.     REVIEW OF SYSTEMS:  CONSTITUTIONAL: No fever, weight loss, or fatigue  EYES: No eye pain, visual disturbances, or discharge  ENMT:  No difficulty hearing, tinnitus, vertigo; No sinus or throat pain  NECK: No pain or stiffness  BREASTS: No pain, masses, or nipple discharge  RESPIRATORY: No cough, wheezing, chills or hemoptysis; No shortness of breath  CARDIOVASCULAR: No chest pain, palpitations, dizziness, or leg swelling  GASTROINTESTINAL: No abdominal or epigastric pain. No nausea, vomiting, or hematemesis; No diarrhea or constipation. No melena or hematochezia.  GENITOURINARY: No dysuria, frequency, hematuria, or incontinence  NEUROLOGICAL: No headaches, memory loss, or tremors  SKIN: No itching, burning, rashes, or lesions   LYMPH NODES: No enlarged glands  ENDOCRINE: No heat or cold intolerance; No hair loss  MUSCULOSKELETAL: No myalgias  PSYCHIATRIC: No depression, anxiety, mood swings, or difficulty sleeping  HEME/LYMPH: No easy bruising, or bleeding gums  ALLERGY AND IMMUNOLOGIC: No hives or eczema    PAST MEDICAL & SURGICAL HISTORY:  Sickle cell trait    Hypothyroidism    Osteoarthritis of right knee    COVID-19 vaccine series completed  3/21    Class 2 obesity with body mass index (BMI) of 36.0 to 36.9 in adult    History of arthroscopy of knee  right, 2010    History of lithotripsy  2000    SOCIAL HISTORY:  Lives: with spouse and children  Smoking Hx: none  ETOH consumption: none  Illicit Drug Use:  None    Allergies    No Known Allergies    Intolerances    Home Medications:  Synthroid 100 mcg (0.1 mg) oral tablet: 1 tab(s) orally once a day (19 May 2021 06:38)    FAMILY HISTORY:  Family history of cardiac arrest (Father)    PHYSICAL EXAM:  Vital Signs Last 24 Hrs  T(C): 36.8 (19 May 2021 06:39), Max: 36.8 (19 May 2021 06:39)  T(F): 98.2 (19 May 2021 06:39), Max: 98.2 (19 May 2021 06:39)  HR: 77 (19 May 2021 08:30) (67 - 79)  BP: 142/78 (19 May 2021 08:30) (121/70 - 151/77)  BP(mean): --  RR: 14 (19 May 2021 08:30) (11 - 14)  SpO2: 98% (19 May 2021 08:30) (94% - 98%)    GENERAL: NAD, well-groomed, well-developed, awake, alert, oriented x 3, fluent and coherent speech  EYES: EOMI, PERRLA, conjunctiva and sclera clear  ENMT: No tonsillar erythema, exudates, or enlargement; Moist mucous membranes, No lesions on oral mucosa  NECK: Supple, No JVD, No Cervical LAD, No thyromegaly, No thyroid nodules  NERVOUS SYSTEM:  Good concentration; Moving all 4 extremities; No gross sensory deficits, No facial droop  CHEST/LUNG: Clear to auscultation bilaterally; No rales, rhonchi, wheezing, or rubs  HEART: Regular rate and rhythm; No murmurs, rubs, or gallops  ABDOMEN: Soft, Nontender, Nondistended, Bowel sounds present, No palpable masses or organomegaly, No bruits  EXTREMITIES:  2+ Peripheral Pulses, No clubbing, cyanosis, or edema  INCISION:  Dressing clean/dry/intact    LABS:  reviewed CBC, BMP, Coags in results section     EKG (was personally reviewed):    yes,

## 2021-05-19 NOTE — DISCHARGE NOTE PROVIDER - NSDCFUSCHEDAPPT_GEN_ALL_CORE_FT
SEGUN JERNIGAN ; 05/19/2021 ; [facility] [address]  SEGUN JERNIGAN ; 06/03/2021 ; [facility] [address]  SEGUN JERNIGAN ; 07/20/2021 ; [facility] [address] SEGUN JERNIGAN ; 06/03/2021 ; [facility]  SEGUN JERNIGAN ; 07/20/2021 ; [facility]

## 2021-05-20 VITALS
HEART RATE: 69 BPM | DIASTOLIC BLOOD PRESSURE: 75 MMHG | SYSTOLIC BLOOD PRESSURE: 122 MMHG | TEMPERATURE: 99 F | OXYGEN SATURATION: 98 % | RESPIRATION RATE: 16 BRPM

## 2021-05-20 LAB
ANION GAP SERPL CALC-SCNC: 8 MMOL/L — SIGNIFICANT CHANGE UP (ref 5–17)
BUN SERPL-MCNC: 15 MG/DL — SIGNIFICANT CHANGE UP (ref 7–23)
CALCIUM SERPL-MCNC: 8.4 MG/DL — SIGNIFICANT CHANGE UP (ref 8.4–10.5)
CHLORIDE SERPL-SCNC: 105 MMOL/L — SIGNIFICANT CHANGE UP (ref 96–108)
CO2 SERPL-SCNC: 26 MMOL/L — SIGNIFICANT CHANGE UP (ref 22–31)
COVID-19 SPIKE DOMAIN AB INTERP: POSITIVE
COVID-19 SPIKE DOMAIN ANTIBODY RESULT: >250 U/ML — HIGH
CREAT SERPL-MCNC: 1.23 MG/DL — SIGNIFICANT CHANGE UP (ref 0.5–1.3)
GLUCOSE SERPL-MCNC: 151 MG/DL — HIGH (ref 70–99)
HCT VFR BLD CALC: 34.1 % — LOW (ref 34.5–45)
HGB BLD-MCNC: 11.3 G/DL — LOW (ref 11.5–15.5)
MCHC RBC-ENTMCNC: 29.9 PG — SIGNIFICANT CHANGE UP (ref 27–34)
MCHC RBC-ENTMCNC: 33.1 GM/DL — SIGNIFICANT CHANGE UP (ref 32–36)
MCV RBC AUTO: 90.2 FL — SIGNIFICANT CHANGE UP (ref 80–100)
NRBC # BLD: 0 /100 WBCS — SIGNIFICANT CHANGE UP (ref 0–0)
PLATELET # BLD AUTO: 161 K/UL — SIGNIFICANT CHANGE UP (ref 150–400)
POTASSIUM SERPL-MCNC: 4.4 MMOL/L — SIGNIFICANT CHANGE UP (ref 3.5–5.3)
POTASSIUM SERPL-SCNC: 4.4 MMOL/L — SIGNIFICANT CHANGE UP (ref 3.5–5.3)
RBC # BLD: 3.78 M/UL — LOW (ref 3.8–5.2)
RBC # FLD: 13.8 % — SIGNIFICANT CHANGE UP (ref 10.3–14.5)
SARS-COV-2 IGG+IGM SERPL QL IA: >250 U/ML — HIGH
SARS-COV-2 IGG+IGM SERPL QL IA: POSITIVE
SODIUM SERPL-SCNC: 139 MMOL/L — SIGNIFICANT CHANGE UP (ref 135–145)
WBC # BLD: 11.71 K/UL — HIGH (ref 3.8–10.5)
WBC # FLD AUTO: 11.71 K/UL — HIGH (ref 3.8–10.5)

## 2021-05-20 PROCEDURE — 73560 X-RAY EXAM OF KNEE 1 OR 2: CPT

## 2021-05-20 PROCEDURE — 88311 DECALCIFY TISSUE: CPT

## 2021-05-20 PROCEDURE — 97535 SELF CARE MNGMENT TRAINING: CPT

## 2021-05-20 PROCEDURE — 88305 TISSUE EXAM BY PATHOLOGIST: CPT

## 2021-05-20 PROCEDURE — 86769 SARS-COV-2 COVID-19 ANTIBODY: CPT

## 2021-05-20 PROCEDURE — 97110 THERAPEUTIC EXERCISES: CPT

## 2021-05-20 PROCEDURE — C1776: CPT

## 2021-05-20 PROCEDURE — 85027 COMPLETE CBC AUTOMATED: CPT

## 2021-05-20 PROCEDURE — 36415 COLL VENOUS BLD VENIPUNCTURE: CPT

## 2021-05-20 PROCEDURE — C1713: CPT

## 2021-05-20 PROCEDURE — 94664 DEMO&/EVAL PT USE INHALER: CPT

## 2021-05-20 PROCEDURE — 80048 BASIC METABOLIC PNL TOTAL CA: CPT

## 2021-05-20 PROCEDURE — 99239 HOSP IP/OBS DSCHRG MGMT >30: CPT

## 2021-05-20 PROCEDURE — C1889: CPT

## 2021-05-20 PROCEDURE — 97116 GAIT TRAINING THERAPY: CPT

## 2021-05-20 PROCEDURE — 97161 PT EVAL LOW COMPLEX 20 MIN: CPT

## 2021-05-20 PROCEDURE — 97165 OT EVAL LOW COMPLEX 30 MIN: CPT

## 2021-05-20 PROCEDURE — 97530 THERAPEUTIC ACTIVITIES: CPT

## 2021-05-20 RX ORDER — ACETAMINOPHEN 500 MG
2 TABLET ORAL
Qty: 0 | Refills: 0 | DISCHARGE
Start: 2021-05-20

## 2021-05-20 RX ORDER — OXYCODONE HYDROCHLORIDE 5 MG/1
1 TABLET ORAL
Qty: 42 | Refills: 0
Start: 2021-05-20 | End: 2021-05-26

## 2021-05-20 RX ADMIN — Medication 1000 MILLIGRAM(S): at 12:40

## 2021-05-20 RX ADMIN — OXYCODONE HYDROCHLORIDE 5 MILLIGRAM(S): 5 TABLET ORAL at 09:20

## 2021-05-20 RX ADMIN — Medication 1000 MILLIGRAM(S): at 11:43

## 2021-05-20 RX ADMIN — Medication 100 MICROGRAM(S): at 05:48

## 2021-05-20 RX ADMIN — OXYCODONE HYDROCHLORIDE 5 MILLIGRAM(S): 5 TABLET ORAL at 08:23

## 2021-05-20 RX ADMIN — Medication 1000 MILLIGRAM(S): at 05:48

## 2021-05-20 RX ADMIN — Medication 400 MILLIGRAM(S): at 05:45

## 2021-05-20 RX ADMIN — Medication 100 MILLIGRAM(S): at 01:36

## 2021-05-20 RX ADMIN — Medication 81 MILLIGRAM(S): at 05:48

## 2021-05-20 RX ADMIN — CELECOXIB 200 MILLIGRAM(S): 200 CAPSULE ORAL at 08:55

## 2021-05-20 RX ADMIN — CELECOXIB 200 MILLIGRAM(S): 200 CAPSULE ORAL at 08:23

## 2021-05-20 RX ADMIN — PANTOPRAZOLE SODIUM 40 MILLIGRAM(S): 20 TABLET, DELAYED RELEASE ORAL at 05:48

## 2021-05-20 NOTE — PROGRESS NOTE ADULT - SUBJECTIVE AND OBJECTIVE BOX
INTERVAL HPI/OVERNIGHT EVENTS:   Patient seen and examined.  Eating, voiding, +flatus.     REVIEW OF SYSTEMS:  See HPI,  all others negative    PHYSICAL EXAM:  Vital Signs Last 24 Hrs  T(C): 36.4 (20 May 2021 08:14), Max: 36.8 (19 May 2021 11:40)  T(F): 97.6 (20 May 2021 08:14), Max: 98.2 (19 May 2021 11:40)  HR: 65 (20 May 2021 08:14) (60 - 79)  BP: 132/75 (20 May 2021 08:14) (98/58 - 144/80)  BP(mean): --  RR: 16 (20 May 2021 08:14) (10 - 18)  SpO2: 100% (20 May 2021 08:14) (94% - 100%)    GENERAL: NAD, well-groomed, well-developed, awake, alert, oriented x 3, fluent and coherent speech  EYES: EOMI, PERRLA, conjunctiva and sclera clear  ENMT: No tonsillar erythema, exudates, or enlargement; Moist mucous membranes, Good dentition, No lesions seen on oral mucosa  NECK: Supple, No JVD, No Cervical LAD, No thyromegaly, No thyroid nodules felt  NERVOUS SYSTEM:  Good concentration; Moving all 4 extremities against gravity and resistance; No gross sensory deficits, No facial droop  CHEST WALL: No masses  CHEST/LUNG: Clear to auscultation bilaterally with good air entry; No rales, rhonchi, wheezing, or rubs  HEART: Regular rate and rhythm; No murmurs, rubs, or gallops  ABDOMEN: Soft, Nontender, Nondistended, Bowel sounds present, No palpable masses or organomegaly, No bruits  EXTREMITIES:  2+ Peripheral Pulses, No clubbing, cyanosis, or edema, no calf tenderness in either leg    Diagnostic Testin.3   11.71 )-----------( 161      ( 20 May 2021 07:21 )             34.1     20 May 2021 07:21    139    |  105    |  15     ----------------------------<  151    4.4     |  26     |  1.23     Ca    8.4        20 May 2021 07:21                INTERVAL HPI/OVERNIGHT EVENTS:   Patient seen and examined.  Eating, voiding, +flatus.  No fevers, chills, sweats, dizziness, HA, changes in vision, cp, palpitations, sob, persistent cough, n/v/d, abd pain, dysuria, focal weakness, or calf pain.      REVIEW OF SYSTEMS:  See HPI,  all others negative    PHYSICAL EXAM:  Vital Signs Last 24 Hrs  T(C): 36.4 (20 May 2021 08:14), Max: 36.8 (19 May 2021 11:40)  T(F): 97.6 (20 May 2021 08:14), Max: 98.2 (19 May 2021 11:40)  HR: 65 (20 May 2021 08:14) (60 - 79)  BP: 132/75 (20 May 2021 08:14) (98/58 - 144/80)  BP(mean): --  RR: 16 (20 May 2021 08:14) (10 - 18)  SpO2: 100% (20 May 2021 08:14) (94% - 100%)    GENERAL: NAD, well-groomed, well-developed, awake, alert, oriented x 3, fluent and coherent speech, sitting up in chair.  EYES: EOMI, PERRLA, conjunctiva and sclera clear  ENMT: No tonsillar erythema, exudates, or enlargement; Moist mucous membranes,   No lesions seen on oral mucosa  NECK: Supple, No JVD, No Cervical LAD, No thyromegaly, No thyroid nodules felt  NERVOUS SYSTEM:  Good concentration; Moving all 4 extremities against gravity and resistance; No gross sensory deficits, No facial droop  CHEST/LUNG: Clear to auscultation bilaterally with good air entry; No rales, rhonchi, wheezing, or rubs  HEART: Regular rate and rhythm; No murmurs, rubs, or gallops  ABDOMEN: Soft, Nontender, Nondistended, Bowel sounds present, No palpable masses or organomegaly, No bruits  EXTREMITIES:  2+ Peripheral Pulses, No clubbing, cyanosis, or edema, no calf tenderness in either leg  WOUND: periwound erythema/edema within expected limits    Diagnostic Testin.3   11.71 )-----------( 161      ( 20 May 2021 07:21 )             34.1     20 May 2021 07:21    139    |  105    |  15     ----------------------------<  151    4.4     |  26     |  1.23     Ca    8.4        20 May 2021 07:21

## 2021-05-20 NOTE — PROGRESS NOTE ADULT - ASSESSMENT
POD#0 s/p RIGHT TKR  - Pain control  - Bowel regimen  - PT/OT  - Incentive spirometry  - VTE PPx - ASA BID    Abnormal pre-op EKG  - cardio clearance reviewed  - had nuclear stress test on 5/12/21, normal study, no ischemia    Hypothyroidism  - continue synthroid    Obesity  - diet/portion control  - exercise program once fully rehabbed from current procedure    Sickle Cell Train  - not anemic at baseline POD#2 s/p RIGHT TKR  - Pain control  - Bowel regimen  - PT/OT  - Incentive spirometry  - VTE PPx - ASA BID  - stable for discharge from medical perspective    Abnormal pre-op EKG - TWI in anterolateral leads  - cardio clearance reviewed  - had nuclear stress test on 5/12/21, normal study, no ischemia    Hypothyroidism  - continue synthroid    Obesity  - diet/portion control  - exercise program once fully rehabbed from current procedure    Sickle Cell Train  - not anemic at baseline

## 2021-05-20 NOTE — PROGRESS NOTE ADULT - SUBJECTIVE AND OBJECTIVE BOX
SEGUN JERNIGAN  [id number]    Pt is a 75y year old Female s/p left TKR. pain is 3/10. (+) Voids, tolerating regular diet. Denies chest pain/shortness of breath/nausea/vomitting.     Vital Signs Last 24 Hrs  T(C): 36.5 (20 May 2021 03:00), Max: 36.8 (19 May 2021 11:40)  T(F): 97.7 (20 May 2021 03:00), Max: 98.2 (19 May 2021 11:40)  HR: 60 (20 May 2021 03:00) (60 - 79)  BP: 130/71 (20 May 2021 03:00) (98/58 - 151/77)  BP(mean): --  RR: 16 (20 May 2021 03:00) (10 - 18)  SpO2: 94% (20 May 2021 03:00) (94% - 100%)    I&O's Detail    19 May 2021 07:01  -  20 May 2021 07:00  --------------------------------------------------------  IN:    IV PiggyBack: 250 mL    Lactated Ringers: 850 mL    Lactated Ringers: 1500 mL    Lactated Ringers Bolus: 500 mL    Oral Fluid: 750 mL    Sodium Chloride 0.9% Bolus: 500 mL  Total IN: 4350 mL    OUT:    Estimated Blood Loss (mL): 75 mL    Voided (mL): 1750 mL  Total OUT: 1825 mL    Total NET: 2525 mL                                11.5   6.13  )-----------( 167      ( 19 May 2021 16:30 )             34.8     05-19    139  |  105  |  14  ----------------------------<  161<H>  4.4   |  27  |  1.23    Ca    8.6      19 May 2021 16:30          PE:   LLE: Dressing changed, incision clean and dry, no erythema or drainage, nylon intact SILT distally, (+2) DP/PT pulses, EHL/FHL/TA intact, Capillary refill < 2 seconds. negative calf tenderness PAS on,    A: 75y year old Female s/p left TKR POD#1    Plan:   -DVT ppx = aspirin enteric coated 81 milliGRAM(s) Oral two times a day    -PT/OT = OOB  -Pain control   -Medicine to follow   -continue to follow Labs  -continue use of PAS  -Dispo: home planning

## 2021-05-20 NOTE — PROGRESS NOTE ADULT - REASON FOR ADMISSION
Patient is a 75y old  Female who presents with a chief complaint of Right TKR for severe Right knee OA (19 May 2021 12:04)

## 2021-05-20 NOTE — ANESTHESIA FOLLOW-UP NOTE - NSEVALATION_GEN_ALL_CORE
Ordered.    No apparent complications or complaints regarding anesthesia care at this time/All questions were answered

## 2021-05-20 NOTE — PROGRESS NOTE ADULT - SUBJECTIVE AND OBJECTIVE BOX
Discharge medication calendar:  Aspirin EC 81mg q12h x 6 weeks  APAP 1000mg q8h x 2-3 weeks  Celecoxib 200mg q12h x 2-3 weeks  Omeprazole 20mg QAM x 6 weeks  Narcotic PRN  Docusate 100mg TID while taking narcotic  Miralax, Senna, or Bisacodyl PRN for treatment of constipation

## 2021-05-28 ENCOUNTER — APPOINTMENT (OUTPATIENT)
Dept: ORTHOPEDIC SURGERY | Facility: CLINIC | Age: 76
End: 2021-05-28

## 2021-06-03 ENCOUNTER — APPOINTMENT (OUTPATIENT)
Dept: ORTHOPEDIC SURGERY | Facility: CLINIC | Age: 76
End: 2021-06-03
Payer: COMMERCIAL

## 2021-06-03 VITALS — DIASTOLIC BLOOD PRESSURE: 81 MMHG | TEMPERATURE: 97.7 F | SYSTOLIC BLOOD PRESSURE: 147 MMHG | HEART RATE: 78 BPM

## 2021-06-03 PROCEDURE — 73562 X-RAY EXAM OF KNEE 3: CPT | Mod: RT

## 2021-06-03 PROCEDURE — 99024 POSTOP FOLLOW-UP VISIT: CPT

## 2021-06-03 NOTE — HISTORY OF PRESENT ILLNESS
[___ Weeks Post Op] : [unfilled] weeks post op [5] : the patient reports pain that is 5/10 in severity [Clean/Dry/Intact] : clean, dry and intact [Swelling] : swollen [Neuro Intact] : an unremarkable neurological exam [Vascular Intact] : ~T peripheral vascular exam normal [Negative Jeannette's] : maneuvers demonstrated a negative Jeannette's sign [Xray (Date:___)] : [unfilled] Xray -  [Doing Well] : is doing well [No Sign of Infection] : is showing no signs of infection [Adequate Pain Control] : has adequate pain control [Sutures Removed] : sutures were removed [Chills] : no chills [Constipation] : no constipation [Diarrhea] : no diarrhea [Dysuria] : no dysuria [Fever] : no fever [Nausea] : no nausea [Vomiting] : no vomiting [Erythema] : not erythematous [Discharge] : absent of discharge [Dehiscence] : not dehisced [de-identified] : Patient is a  75 year old female S/P right total  knee replacement performed at [facility] on 5/19/21. Patient presents today for her first post operative visit and suture removal. [de-identified] : The patient was discharged home with daily physical therapy and home exercises. She verbalized feeling well overall.  The patient reports pain of 5/10. She is taking  Oxycodone PRN and Tylenol TID for pain relief. Patient is using EC. Aspirin 81 MG twice a day for DVT prophylaxis. The patient is using Celebrex to decrease inflammation. [de-identified] : The patient is ambulating with moderate antalgic gait utilizing a walker. Patient is S/P right total knee replacement. The knee is with sutures intact. The surgical incision site is without redness, heat or drainage. No palpable hematoma or effusion. No calf tenderness. Positive distal pulses. The active ROM of the right knee is from -10 to 90 degrees. No evidence of ligament laxity, muscle atrophy, motor or sensory deficit. No flexion contracture or extension lag noted. Good quadriceps formation on extension and flexion and no palpable deficits. The right lower extremity is with moderate swelling. There is no evidence of infection or cellulitis on the incision site. [de-identified] : 3 views of the right knee was obtained at today's visit. X-rays reveal a well-positioned, well fixed total knee replacement in good alignment. There is no obvious evidence of fractures, dislocation or osteolysis. [de-identified] : Right total knee replacement [de-identified] : Sutures were  removed from the right knee and replaced with Steri-Strips. Patient tolerated  it well. Incisional care was reviewed with the patient. The patient was advised of the nature of the healing process and of the importance of adhering to the DVT prophylaxis with Aspirin 81 MG BID for a total of 6 weeks post operative. Patient to continue with physical therapy and home exercises as recommended. Prescription was given for out patient physical therapy. Patient was encouraged to engage in isometric exercises to assist the knee to come to full extension. She was advised to continue to apply ice to the knee 3-4 times a day for 20 minutes and keep the right lower extremity elevated above the level of the heart to decrease swelling. Prescription was given for antibiotics Amoxicillin for dental prophylaxis as per Dr. Robert protocol. She will continue with Oxycodone as needed and Tylenol as prescribed for pain relief. Patient to make a follow up appointment to see Dr. Robert in 6 weeks. She was educated on the signs and symptoms of infection to report. Patient verbalized understanding of all instructions and all of her questions were addressed to her satisfaction. Patient was advised to call this office if she has further questions or concerns.

## 2021-07-20 ENCOUNTER — APPOINTMENT (OUTPATIENT)
Dept: ORTHOPEDIC SURGERY | Facility: CLINIC | Age: 76
End: 2021-07-20
Payer: COMMERCIAL

## 2021-07-20 VITALS
BODY MASS INDEX: 35.79 KG/M2 | SYSTOLIC BLOOD PRESSURE: 123 MMHG | HEIGHT: 67 IN | DIASTOLIC BLOOD PRESSURE: 82 MMHG | HEART RATE: 81 BPM | WEIGHT: 228 LBS

## 2021-07-20 DIAGNOSIS — Z96.651 PRESENCE OF RIGHT ARTIFICIAL KNEE JOINT: ICD-10-CM

## 2021-07-20 PROCEDURE — 73562 X-RAY EXAM OF KNEE 3: CPT | Mod: RT

## 2021-07-20 PROCEDURE — 99024 POSTOP FOLLOW-UP VISIT: CPT

## 2022-04-05 ENCOUNTER — APPOINTMENT (OUTPATIENT)
Dept: ORTHOPEDIC SURGERY | Facility: CLINIC | Age: 77
End: 2022-04-05
Payer: COMMERCIAL

## 2022-04-05 VITALS
HEART RATE: 72 BPM | DIASTOLIC BLOOD PRESSURE: 85 MMHG | BODY MASS INDEX: 36.91 KG/M2 | SYSTOLIC BLOOD PRESSURE: 132 MMHG | HEIGHT: 67 IN | WEIGHT: 235.2 LBS

## 2022-04-05 DIAGNOSIS — M25.562 PAIN IN LEFT KNEE: ICD-10-CM

## 2022-04-05 DIAGNOSIS — M17.12 UNILATERAL PRIMARY OSTEOARTHRITIS, LEFT KNEE: ICD-10-CM

## 2022-04-05 PROCEDURE — 73562 X-RAY EXAM OF KNEE 3: CPT | Mod: LT

## 2022-04-05 PROCEDURE — 99214 OFFICE O/P EST MOD 30 MIN: CPT

## 2022-04-05 RX ORDER — ASPIRIN 81 MG
81 TABLET, DELAYED RELEASE (ENTERIC COATED) ORAL
Refills: 0 | Status: ACTIVE | COMMUNITY

## 2022-06-02 ENCOUNTER — OUTPATIENT (OUTPATIENT)
Dept: OUTPATIENT SERVICES | Facility: HOSPITAL | Age: 77
LOS: 1 days | End: 2022-06-02
Payer: COMMERCIAL

## 2022-06-02 VITALS
RESPIRATION RATE: 14 BRPM | TEMPERATURE: 97 F | HEART RATE: 62 BPM | OXYGEN SATURATION: 99 % | DIASTOLIC BLOOD PRESSURE: 80 MMHG | WEIGHT: 235.01 LBS | HEIGHT: 66 IN | SYSTOLIC BLOOD PRESSURE: 130 MMHG

## 2022-06-02 DIAGNOSIS — M17.12 UNILATERAL PRIMARY OSTEOARTHRITIS, LEFT KNEE: ICD-10-CM

## 2022-06-02 DIAGNOSIS — M19.90 UNSPECIFIED OSTEOARTHRITIS, UNSPECIFIED SITE: ICD-10-CM

## 2022-06-02 DIAGNOSIS — Z01.818 ENCOUNTER FOR OTHER PREPROCEDURAL EXAMINATION: ICD-10-CM

## 2022-06-02 DIAGNOSIS — Z98.890 OTHER SPECIFIED POSTPROCEDURAL STATES: Chronic | ICD-10-CM

## 2022-06-02 DIAGNOSIS — M25.562 PAIN IN LEFT KNEE: ICD-10-CM

## 2022-06-02 DIAGNOSIS — Z96.651 PRESENCE OF RIGHT ARTIFICIAL KNEE JOINT: Chronic | ICD-10-CM

## 2022-06-02 LAB
A1C WITH ESTIMATED AVERAGE GLUCOSE RESULT: 6.1 % — HIGH (ref 4–5.6)
ALBUMIN SERPL ELPH-MCNC: 3.9 G/DL — SIGNIFICANT CHANGE UP (ref 3.3–5)
ALP SERPL-CCNC: 58 U/L — SIGNIFICANT CHANGE UP (ref 30–120)
ALT FLD-CCNC: 19 U/L DA — SIGNIFICANT CHANGE UP (ref 10–60)
ANION GAP SERPL CALC-SCNC: 8 MMOL/L — SIGNIFICANT CHANGE UP (ref 5–17)
APTT BLD: 34.8 SEC — SIGNIFICANT CHANGE UP (ref 27.5–35.5)
AST SERPL-CCNC: 21 U/L — SIGNIFICANT CHANGE UP (ref 10–40)
BILIRUB SERPL-MCNC: 0.4 MG/DL — SIGNIFICANT CHANGE UP (ref 0.2–1.2)
BLD GP AB SCN SERPL QL: SIGNIFICANT CHANGE UP
BUN SERPL-MCNC: 15 MG/DL — SIGNIFICANT CHANGE UP (ref 7–23)
CALCIUM SERPL-MCNC: 9.3 MG/DL — SIGNIFICANT CHANGE UP (ref 8.4–10.5)
CHLORIDE SERPL-SCNC: 105 MMOL/L — SIGNIFICANT CHANGE UP (ref 96–108)
CO2 SERPL-SCNC: 28 MMOL/L — SIGNIFICANT CHANGE UP (ref 22–31)
CREAT SERPL-MCNC: 1.14 MG/DL — SIGNIFICANT CHANGE UP (ref 0.5–1.3)
EGFR: 51 ML/MIN/1.73M2 — LOW
ESTIMATED AVERAGE GLUCOSE: 128 MG/DL — HIGH (ref 68–114)
GLUCOSE SERPL-MCNC: 88 MG/DL — SIGNIFICANT CHANGE UP (ref 70–99)
HCT VFR BLD CALC: 37.8 % — SIGNIFICANT CHANGE UP (ref 34.5–45)
HGB BLD-MCNC: 12.6 G/DL — SIGNIFICANT CHANGE UP (ref 11.5–15.5)
INR BLD: 1.02 RATIO — SIGNIFICANT CHANGE UP (ref 0.88–1.16)
MCHC RBC-ENTMCNC: 29.4 PG — SIGNIFICANT CHANGE UP (ref 27–34)
MCHC RBC-ENTMCNC: 33.3 GM/DL — SIGNIFICANT CHANGE UP (ref 32–36)
MCV RBC AUTO: 88.3 FL — SIGNIFICANT CHANGE UP (ref 80–100)
MRSA PCR RESULT.: SIGNIFICANT CHANGE UP
NRBC # BLD: 0 /100 WBCS — SIGNIFICANT CHANGE UP (ref 0–0)
PLATELET # BLD AUTO: 189 K/UL — SIGNIFICANT CHANGE UP (ref 150–400)
POTASSIUM SERPL-MCNC: 4.2 MMOL/L — SIGNIFICANT CHANGE UP (ref 3.5–5.3)
POTASSIUM SERPL-SCNC: 4.2 MMOL/L — SIGNIFICANT CHANGE UP (ref 3.5–5.3)
PROT SERPL-MCNC: 7.4 G/DL — SIGNIFICANT CHANGE UP (ref 6–8.3)
PROTHROM AB SERPL-ACNC: 12 SEC — SIGNIFICANT CHANGE UP (ref 10.5–13.4)
RBC # BLD: 4.28 M/UL — SIGNIFICANT CHANGE UP (ref 3.8–5.2)
RBC # FLD: 13.6 % — SIGNIFICANT CHANGE UP (ref 10.3–14.5)
S AUREUS DNA NOSE QL NAA+PROBE: SIGNIFICANT CHANGE UP
SODIUM SERPL-SCNC: 141 MMOL/L — SIGNIFICANT CHANGE UP (ref 135–145)
WBC # BLD: 4.63 K/UL — SIGNIFICANT CHANGE UP (ref 3.8–10.5)
WBC # FLD AUTO: 4.63 K/UL — SIGNIFICANT CHANGE UP (ref 3.8–10.5)

## 2022-06-02 PROCEDURE — 93010 ELECTROCARDIOGRAM REPORT: CPT

## 2022-06-02 PROCEDURE — 93005 ELECTROCARDIOGRAM TRACING: CPT

## 2022-06-02 PROCEDURE — G0463: CPT

## 2022-06-02 NOTE — H&P PST ADULT - PROBLEM SELECTOR PLAN 1
scheduled for left knee replacement on 6/15/22  Medical clearance   cardiac clearance   pre op instructions scheduled for left knee replacement on 6/15/22  Medical clearance   cardiac clearance   Abnormal ; EKG NSR with T wave inversion in anterior leads . referred to cardiologist   pre op instructions  COVID test at [facility]

## 2022-06-02 NOTE — H&P PST ADULT - MUSCULOSKELETAL
diminished strength/no joint erythema/no joint warmth/no calf tenderness/decreased ROM due to pain/joint swelling details… detailed exam

## 2022-06-02 NOTE — H&P PST ADULT - NSICDXPASTSURGICALHX_GEN_ALL_CORE_FT
PAST SURGICAL HISTORY:  History of arthroscopy of knee right, 2010    S/P total knee replacement, right 5/2021

## 2022-06-02 NOTE — H&P PST ADULT - SYMPTOMS
none Doxepin Pregnancy And Lactation Text: This medication is Pregnancy Category C and it isn't known if it is safe during pregnancy. It is also excreted in breast milk and breast feeding isn't recommended.

## 2022-06-02 NOTE — H&P PST ADULT - HISTORY OF PRESENT ILLNESS
76 yo female with 10  year history of worsening left knee pain anfd difficulty walking . Reports painn when getting up from sitting position , walking   She is scheduled for left  total knee replacement on 6/15/22 76 yo female with 10  year history of worsening left knee pain and difficulty walking . Reports pain when getting up from sitting position , walking and standing for long time   She is scheduled for left  total knee replacement on 6/15/22

## 2022-06-02 NOTE — H&P PST ADULT - NSICDXPASTMEDICALHX_GEN_ALL_CORE_FT
PAST MEDICAL HISTORY:  Class 2 obesity with body mass index (BMI) of 36.0 to 36.9 in adult     COVID-19 vaccine series completed 3/21    Hypothyroidism     Osteoarthritis of left knee     Sickle cell trait

## 2022-06-07 ENCOUNTER — APPOINTMENT (OUTPATIENT)
Dept: CARDIOLOGY | Facility: CLINIC | Age: 77
End: 2022-06-07
Payer: COMMERCIAL

## 2022-06-07 ENCOUNTER — NON-APPOINTMENT (OUTPATIENT)
Age: 77
End: 2022-06-07

## 2022-06-07 VITALS
OXYGEN SATURATION: 96 % | WEIGHT: 236 LBS | HEART RATE: 85 BPM | HEIGHT: 67 IN | BODY MASS INDEX: 37.04 KG/M2 | SYSTOLIC BLOOD PRESSURE: 118 MMHG | DIASTOLIC BLOOD PRESSURE: 74 MMHG

## 2022-06-07 PROCEDURE — 93306 TTE W/DOPPLER COMPLETE: CPT

## 2022-06-07 PROCEDURE — 99214 OFFICE O/P EST MOD 30 MIN: CPT

## 2022-06-07 PROCEDURE — 93000 ELECTROCARDIOGRAM COMPLETE: CPT | Mod: NC

## 2022-06-07 NOTE — REVIEW OF SYSTEMS
[Joint Pain] : joint pain [Negative] : Respiratory [Blurry Vision] : no blurred vision [Dyspnea on exertion] : not dyspnea during exertion [Chest Discomfort] : no chest discomfort [Lower Ext Edema] : no extremity edema [Palpitations] : no palpitations [Orthopnea] : no orthopnea [PND] : no PND [Abdominal Pain] : no abdominal pain [Nausea] : no nausea [Vomiting] : no vomiting [Heartburn] : no heartburn [Rash] : no rash [Itching] : no itching [Dizziness] : no dizziness [Tremor] : no tremor was seen [Numbness (Hypoesthesia)] : no numbness [Tingling (Paresthesia)] : no tingling [Confusion] : no confusion was observed [Anxiety] : no anxiety [Under Stress] : not under stress [Easy Bleeding] : no tendency for easy bleeding [Easy Bruising] : no tendency for easy bruising

## 2022-06-07 NOTE — DISCUSSION/SUMMARY
[FreeTextEntry1] : In a summary Aviva Moffett is an elderly female with Abnormal EKG showing anterolateral T wave inversions and Pre- op cardiovascular exam for left knee replacement,  echo done showed normal LV systolic function and wall motion. Echo results explained. Nuclear stress test done 5/2021 was negative for ischemia. With normal cardiac testing and asymptomatic cardiac wise, will be low risk cardiac wise for left knee replacement. Explained Ms. Chicas in detail. Spent 30 minutes on encounter.

## 2022-06-07 NOTE — HISTORY OF PRESENT ILLNESS
[FreeTextEntry1] : Aviva Moffett is a 76 year old female with history of hypothyroidism and abnormal EKG comes for pre op cardiac evaluation for left knee replacement . Denies any chest pain or palpitations. Denies any  shortness of breath on exertion. Can not assess functional capacity as she can not walk much because of joint pains.

## 2022-06-07 NOTE — PHYSICAL EXAM
[Normal Conjunctiva] : normal conjunctiva [No Carotid Bruit] : no carotid bruit [Soft] : abdomen soft [Non Tender] : non-tender [Normal Bowel Sounds] : normal bowel sounds [No Edema] : no edema [No Cyanosis] : no cyanosis [Normal] : alert and oriented, normal memory [de-identified] : walks slowly.

## 2022-06-09 ENCOUNTER — NON-APPOINTMENT (OUTPATIENT)
Age: 77
End: 2022-06-09

## 2022-06-09 PROBLEM — M17.12 UNILATERAL PRIMARY OSTEOARTHRITIS, LEFT KNEE: Chronic | Status: ACTIVE | Noted: 2022-06-02

## 2022-06-13 LAB — SARS-COV-2 N GENE NPH QL NAA+PROBE: NOT DETECTED

## 2022-06-14 ENCOUNTER — FORM ENCOUNTER (OUTPATIENT)
Age: 77
End: 2022-06-14

## 2022-06-14 RX ORDER — POLYETHYLENE GLYCOL 3350 17 G/17G
17 POWDER, FOR SOLUTION ORAL AT BEDTIME
Refills: 0 | Status: DISCONTINUED | OUTPATIENT
Start: 2022-06-15 | End: 2022-06-29

## 2022-06-14 RX ORDER — PANTOPRAZOLE SODIUM 20 MG/1
40 TABLET, DELAYED RELEASE ORAL
Refills: 0 | Status: DISCONTINUED | OUTPATIENT
Start: 2022-06-15 | End: 2022-06-29

## 2022-06-14 RX ORDER — ONDANSETRON 8 MG/1
4 TABLET, FILM COATED ORAL EVERY 6 HOURS
Refills: 0 | Status: DISCONTINUED | OUTPATIENT
Start: 2022-06-15 | End: 2022-06-29

## 2022-06-14 RX ORDER — SENNA PLUS 8.6 MG/1
2 TABLET ORAL AT BEDTIME
Refills: 0 | Status: DISCONTINUED | OUTPATIENT
Start: 2022-06-15 | End: 2022-06-29

## 2022-06-14 RX ORDER — MAGNESIUM HYDROXIDE 400 MG/1
30 TABLET, CHEWABLE ORAL DAILY
Refills: 0 | Status: DISCONTINUED | OUTPATIENT
Start: 2022-06-15 | End: 2022-06-29

## 2022-06-14 RX ORDER — SODIUM CHLORIDE 9 MG/ML
1000 INJECTION, SOLUTION INTRAVENOUS
Refills: 0 | Status: DISCONTINUED | OUTPATIENT
Start: 2022-06-15 | End: 2022-06-29

## 2022-06-14 NOTE — ASU PATIENT PROFILE, ADULT - FALL HARM RISK - RISK INTERVENTIONS

## 2022-06-15 ENCOUNTER — TRANSCRIPTION ENCOUNTER (OUTPATIENT)
Age: 77
End: 2022-06-15

## 2022-06-15 ENCOUNTER — RESULT REVIEW (OUTPATIENT)
Age: 77
End: 2022-06-15

## 2022-06-15 ENCOUNTER — APPOINTMENT (OUTPATIENT)
Dept: ORTHOPEDIC SURGERY | Facility: HOSPITAL | Age: 77
End: 2022-06-15

## 2022-06-15 ENCOUNTER — OUTPATIENT (OUTPATIENT)
Dept: INPATIENT UNIT | Facility: HOSPITAL | Age: 77
LOS: 1 days | End: 2022-06-15
Payer: COMMERCIAL

## 2022-06-15 VITALS
TEMPERATURE: 98 F | HEART RATE: 64 BPM | RESPIRATION RATE: 13 BRPM | SYSTOLIC BLOOD PRESSURE: 149 MMHG | DIASTOLIC BLOOD PRESSURE: 75 MMHG | HEIGHT: 66 IN | WEIGHT: 233.03 LBS | OXYGEN SATURATION: 100 %

## 2022-06-15 DIAGNOSIS — Z98.890 OTHER SPECIFIED POSTPROCEDURAL STATES: Chronic | ICD-10-CM

## 2022-06-15 DIAGNOSIS — M25.562 PAIN IN LEFT KNEE: ICD-10-CM

## 2022-06-15 DIAGNOSIS — Z96.651 PRESENCE OF RIGHT ARTIFICIAL KNEE JOINT: Chronic | ICD-10-CM

## 2022-06-15 PROCEDURE — 73562 X-RAY EXAM OF KNEE 3: CPT | Mod: 26,LT

## 2022-06-15 PROCEDURE — 99204 OFFICE O/P NEW MOD 45 MIN: CPT

## 2022-06-15 PROCEDURE — 88311 DECALCIFY TISSUE: CPT | Mod: 26

## 2022-06-15 PROCEDURE — 99214 OFFICE O/P EST MOD 30 MIN: CPT

## 2022-06-15 PROCEDURE — 88305 TISSUE EXAM BY PATHOLOGIST: CPT | Mod: 26

## 2022-06-15 PROCEDURE — 27447 TOTAL KNEE ARTHROPLASTY: CPT | Mod: LT

## 2022-06-15 DEVICE — INSERT TIB NONPOROUS UNIV SZ 6 LT: Type: IMPLANTABLE DEVICE | Status: FUNCTIONAL

## 2022-06-15 DEVICE — COMP PATELLA TRI-PEG E-PLUS POLY 8X32MM: Type: IMPLANTABLE DEVICE | Status: FUNCTIONAL

## 2022-06-15 DEVICE — CEMENT BONE PALACOS PRO HIGH VISCOSITY 80G W GENTAMICIN: Type: IMPLANTABLE DEVICE | Status: FUNCTIONAL

## 2022-06-15 DEVICE — BONE WAX 2.5GM: Type: IMPLANTABLE DEVICE | Status: FUNCTIONAL

## 2022-06-15 DEVICE — CEMENT BONE COBALT G-HV: Type: IMPLANTABLE DEVICE | Status: FUNCTIONAL

## 2022-06-15 DEVICE — IMPLANTABLE DEVICE: Type: IMPLANTABLE DEVICE | Status: FUNCTIONAL

## 2022-06-15 DEVICE — COMP FEM NON POROUS SZ 6 LT: Type: IMPLANTABLE DEVICE | Status: FUNCTIONAL

## 2022-06-15 RX ORDER — CELECOXIB 200 MG/1
200 CAPSULE ORAL EVERY 12 HOURS
Refills: 0 | Status: DISCONTINUED | OUTPATIENT
Start: 2022-06-15 | End: 2022-06-29

## 2022-06-15 RX ORDER — SODIUM CHLORIDE 9 MG/ML
1000 INJECTION, SOLUTION INTRAVENOUS
Refills: 0 | Status: DISCONTINUED | OUTPATIENT
Start: 2022-06-15 | End: 2022-06-15

## 2022-06-15 RX ORDER — ASPIRIN/CALCIUM CARB/MAGNESIUM 324 MG
81 TABLET ORAL EVERY 12 HOURS
Refills: 0 | Status: DISCONTINUED | OUTPATIENT
Start: 2022-06-16 | End: 2022-06-29

## 2022-06-15 RX ORDER — OXYCODONE HYDROCHLORIDE 5 MG/1
10 TABLET ORAL
Refills: 0 | Status: DISCONTINUED | OUTPATIENT
Start: 2022-06-15 | End: 2022-06-15

## 2022-06-15 RX ORDER — CELECOXIB 200 MG/1
1 CAPSULE ORAL
Qty: 60 | Refills: 0
Start: 2022-06-15 | End: 2022-07-14

## 2022-06-15 RX ORDER — ACETAMINOPHEN 500 MG
1000 TABLET ORAL EVERY 8 HOURS
Refills: 0 | Status: DISCONTINUED | OUTPATIENT
Start: 2022-06-15 | End: 2022-06-29

## 2022-06-15 RX ORDER — LEVOTHYROXINE SODIUM 125 MCG
100 TABLET ORAL DAILY
Refills: 0 | Status: DISCONTINUED | OUTPATIENT
Start: 2022-06-16 | End: 2022-06-29

## 2022-06-15 RX ORDER — ASPIRIN/CALCIUM CARB/MAGNESIUM 324 MG
1 TABLET ORAL
Qty: 56 | Refills: 0
Start: 2022-06-15 | End: 2022-07-12

## 2022-06-15 RX ORDER — CEFAZOLIN SODIUM 1 G
2000 VIAL (EA) INJECTION ONCE
Refills: 0 | Status: COMPLETED | OUTPATIENT
Start: 2022-06-15 | End: 2022-06-15

## 2022-06-15 RX ORDER — ACETAMINOPHEN 500 MG
2 TABLET ORAL
Qty: 0 | Refills: 0 | DISCHARGE
Start: 2022-06-15

## 2022-06-15 RX ORDER — OXYCODONE HYDROCHLORIDE 5 MG/1
5 TABLET ORAL
Refills: 0 | Status: DISCONTINUED | OUTPATIENT
Start: 2022-06-15 | End: 2022-06-15

## 2022-06-15 RX ORDER — CHLORHEXIDINE GLUCONATE 213 G/1000ML
1 SOLUTION TOPICAL ONCE
Refills: 0 | Status: COMPLETED | OUTPATIENT
Start: 2022-06-15 | End: 2022-06-15

## 2022-06-15 RX ORDER — HYDROMORPHONE HYDROCHLORIDE 2 MG/ML
0.5 INJECTION INTRAMUSCULAR; INTRAVENOUS; SUBCUTANEOUS
Refills: 0 | Status: DISCONTINUED | OUTPATIENT
Start: 2022-06-15 | End: 2022-06-15

## 2022-06-15 RX ORDER — OMEPRAZOLE 10 MG/1
1 CAPSULE, DELAYED RELEASE ORAL
Qty: 28 | Refills: 0
Start: 2022-06-15 | End: 2022-07-12

## 2022-06-15 RX ORDER — TRANEXAMIC ACID 100 MG/ML
1000 INJECTION, SOLUTION INTRAVENOUS ONCE
Refills: 0 | Status: COMPLETED | OUTPATIENT
Start: 2022-06-15 | End: 2022-06-15

## 2022-06-15 RX ORDER — OXYCODONE HYDROCHLORIDE 5 MG/1
5 TABLET ORAL ONCE
Refills: 0 | Status: DISCONTINUED | OUTPATIENT
Start: 2022-06-15 | End: 2022-06-15

## 2022-06-15 RX ORDER — APREPITANT 80 MG/1
40 CAPSULE ORAL ONCE
Refills: 0 | Status: COMPLETED | OUTPATIENT
Start: 2022-06-15 | End: 2022-06-15

## 2022-06-15 RX ORDER — ACETAMINOPHEN 500 MG
1000 TABLET ORAL ONCE
Refills: 0 | Status: COMPLETED | OUTPATIENT
Start: 2022-06-15 | End: 2022-06-15

## 2022-06-15 RX ORDER — ONDANSETRON 8 MG/1
4 TABLET, FILM COATED ORAL ONCE
Refills: 0 | Status: DISCONTINUED | OUTPATIENT
Start: 2022-06-15 | End: 2022-06-15

## 2022-06-15 RX ORDER — CEFAZOLIN SODIUM 1 G
2000 VIAL (EA) INJECTION EVERY 8 HOURS
Refills: 0 | Status: COMPLETED | OUTPATIENT
Start: 2022-06-15 | End: 2022-06-16

## 2022-06-15 RX ORDER — SODIUM CHLORIDE 9 MG/ML
500 INJECTION INTRAMUSCULAR; INTRAVENOUS; SUBCUTANEOUS ONCE
Refills: 0 | Status: COMPLETED | OUTPATIENT
Start: 2022-06-15 | End: 2022-06-15

## 2022-06-15 RX ADMIN — SODIUM CHLORIDE 1000 MILLILITER(S): 9 INJECTION INTRAMUSCULAR; INTRAVENOUS; SUBCUTANEOUS at 13:44

## 2022-06-15 RX ADMIN — SODIUM CHLORIDE 100 MILLILITER(S): 9 INJECTION, SOLUTION INTRAVENOUS at 13:44

## 2022-06-15 RX ADMIN — CELECOXIB 200 MILLIGRAM(S): 200 CAPSULE ORAL at 21:58

## 2022-06-15 RX ADMIN — CELECOXIB 200 MILLIGRAM(S): 200 CAPSULE ORAL at 22:05

## 2022-06-15 RX ADMIN — SODIUM CHLORIDE 100 MILLILITER(S): 9 INJECTION, SOLUTION INTRAVENOUS at 21:57

## 2022-06-15 RX ADMIN — CHLORHEXIDINE GLUCONATE 1 APPLICATION(S): 213 SOLUTION TOPICAL at 06:16

## 2022-06-15 RX ADMIN — Medication 400 MILLIGRAM(S): at 13:44

## 2022-06-15 RX ADMIN — OXYCODONE HYDROCHLORIDE 5 MILLIGRAM(S): 5 TABLET ORAL at 14:19

## 2022-06-15 RX ADMIN — APREPITANT 40 MILLIGRAM(S): 80 CAPSULE ORAL at 06:16

## 2022-06-15 RX ADMIN — Medication 1000 MILLIGRAM(S): at 15:33

## 2022-06-15 RX ADMIN — Medication 1000 MILLIGRAM(S): at 22:05

## 2022-06-15 RX ADMIN — SODIUM CHLORIDE 1000 MILLILITER(S): 9 INJECTION INTRAMUSCULAR; INTRAVENOUS; SUBCUTANEOUS at 11:17

## 2022-06-15 RX ADMIN — SENNA PLUS 2 TABLET(S): 8.6 TABLET ORAL at 21:59

## 2022-06-15 RX ADMIN — OXYCODONE HYDROCHLORIDE 5 MILLIGRAM(S): 5 TABLET ORAL at 14:49

## 2022-06-15 RX ADMIN — Medication 100 MILLIGRAM(S): at 15:42

## 2022-06-15 RX ADMIN — Medication 1000 MILLIGRAM(S): at 21:57

## 2022-06-15 NOTE — PHYSICAL THERAPY INITIAL EVALUATION ADULT - ACTIVE RANGE OF MOTION EXAMINATION, REHAB EVAL
Left ankle foot hip WNL , left knee flexion 65 degrees/jo ann. upper extremity Active ROM was WNL (within normal limits)/RLE Active ROM was WNL (within normal limits)/deficits as listed below

## 2022-06-15 NOTE — DISCHARGE NOTE PROVIDER - NSDCCPCAREPLAN_GEN_ALL_CORE_FT
PRINCIPAL DISCHARGE DIAGNOSIS  Diagnosis: Primary osteoarthritis of left knee  Assessment and Plan of Treatment: end stage DJD

## 2022-06-15 NOTE — PHYSICAL THERAPY INITIAL EVALUATION ADULT - ADDITIONAL COMMENTS
Pt lives in house with 3 steps to enter no HR, no steps inside. Owns RW cane commode grab bars. Tub shower.

## 2022-06-15 NOTE — CONSULT NOTE ADULT - ASSESSMENT
POD#0 s/p LEFT TKR  - Pain control  - Bowel regimen  - PT/OT  - Incentive spirometry  - VTE PPx - ASA BID    Prediabetes  - monitor glucose on BMP  - diet/portion control to reduce risk of progression    Hypothyroidism  - continue synthroid

## 2022-06-15 NOTE — DISCHARGE NOTE PROVIDER - NSDCFUADDAPPT_GEN_ALL_CORE_FT
It is advisable to follow up with your primary care provider within the next 2-3 weeks to ensure your medications are appropriate and there are no underlying problems after your procedure.

## 2022-06-15 NOTE — DISCHARGE NOTE PROVIDER - HOSPITAL COURSE
This 75yo female patient was admitted to [facility] on 6/15/22 with a history of severe degenerative joint disease of the left knee and for elective total joint replacement.  Patient had appropriate preop medical evaluation and testing.    Patient received antibiotics according to SCIP guidelines for infection prevention.  The patient underwent an uncomplicated left total knee replacement by Dr. Reji Robert on 6/15.   Aspirin and PAS were ordered for DVT prophylaxis.  Anesthesia, Medical Hospitalist, Physical Therapy and Occupational Therapy were consulted.  Patient is stable for discharge home with home care services and with a good prognosis on 6/16/22.  Appropriate discharge instructions and medications are provided in this document.

## 2022-06-15 NOTE — DISCHARGE NOTE PROVIDER - CARE PROVIDER_API CALL
Reji Robert)  Orthopedic Surgery  [address], [address]  [place] [address]  Phone: [phone]  Fax: [phone]  Scheduled Appointment: 06/30/2022 10:30 AM

## 2022-06-15 NOTE — DISCHARGE NOTE PROVIDER - NSDCFUSCHEDAPPT_GEN_ALL_CORE_FT
Reji Robert  [facility]  Orthosurg [address]  Scheduled Appointment: 06/15/2022    Migdalia Downs  [facility]  ORTHORG [address]  Scheduled Appointment: 06/30/2022    Reji Robert  [facility] PreAdmits.  Scheduled Appointment: 06/30/2022    [facility] [address]  Scheduled Appointment: 08/30/2022     Migdalia Downs  [facility]  ORTHOSURG [address] [place] [address]  Scheduled Appointment: 06/30/2022    Reji Robert  [facility] PreAdmits.  Scheduled Appointment: 06/30/2022    Reji Robert  [facility]  ORTHOR[address]  Scheduled Appointment: 08/30/2022

## 2022-06-15 NOTE — DISCHARGE NOTE PROVIDER - NSDCFUADDINST_GEN_ALL_CORE_FT
Call your doctor if you experience:  • An increase in pain not controlled by pain medication or change in activity or  position.  • Temperature greater than 101° F.  • Redness, increased swelling or foul smelling drainage from or around the  incision.  • Numbness, tingling or a change in color or temperature of the operative leg.  • Call your doctor immediately if you experience chest pain, shortness of breath or calf pain.  For Constipation :   • Increase your daily water intake.   • Try adding fiber to your diet by eating fruits, vegetables and foods that are rich in grains.  • If you do experience constipation, you may take an over-the-counter stool softener/laxative such as Colace, Senokot , Milk of Magnesia or Miralax.

## 2022-06-15 NOTE — CONSULT NOTE ADULT - SUBJECTIVE AND OBJECTIVE BOX
Information Obtained from:  EHR, Physical Chart, Patient at bedside     HPI:  77yo F admitted s/p LEFT TKR today.  Has had worsening left knee pain over past 10 years, difficulty ambulating                REVIEW OF SYSTEMS:  see HPI, others negative    PAST MEDICAL & SURGICAL HISTORY:  Sickle cell trait      Hypothyroidism      COVID-19 vaccine series completed  3/21      Class 2 obesity with body mass index (BMI) of 36.0 to 36.9 in adult      Osteoarthritis of left knee      History of arthroscopy of knee  right, 2010      S/P total knee replacement, right  5/2021          SOCIAL HISTORY:  Lives:   ETOH consumption:  Smoking Hx:  Illicit Drug Use:  None    Allergies    No Known Allergies    Intolerances         Home Medications:  Synthroid 100 mcg (0.1 mg) oral tablet: 1 tab(s) orally once a day (15 Ra 2022 06:28)      FAMILY HISTORY:      PHYSICAL EXAM:  Vital Signs Last 24 Hrs  T(C): 36.8 (15 Ra 2022 06:17), Max: 36.8 (15 Ra 2022 06:17)  T(F): 98.3 (15 Ra 2022 06:17), Max: 98.3 (15 Ra 2022 06:17)  HR: 64 (15 Ra 2022 06:17) (64 - 64)  BP: 149/75 (15 Ra 2022 06:17) (149/75 - 149/75)  BP(mean): --  RR: 13 (15 Ra 2022 06:17) (13 - 13)  SpO2: 100% (15 Ra 2022 06:17) (100% - 100%)    GENERAL: NAD, well-groomed, well-developed, awake, alert, oriented x 3, fluent and coherent speech  EYES: EOMI, PERRLA, conjunctiva and sclera clear  ENMT: No tonsillar erythema, exudates, or enlargement; Moist mucous membranes, No lesions on oral mucosa  NECK: Supple, No JVD, No Cervical LAD, No thyromegaly, No thyroid nodules  NERVOUS SYSTEM:  Good concentration; Moving all 4 extremities; No gross sensory deficits, No facial droop  CHEST WALL: No masses  CHEST/LUNG: Clear to auscultation bilaterally; No rales, rhonchi, wheezing, or rubs  HEART: Regular rate and rhythm; No murmurs, rubs, or gallops  ABDOMEN: Soft, Nontender, Nondistended, Bowel sounds present, No palpable masses or organomegaly, No bruits  EXTREMITIES:  2+ Peripheral Pulses, No clubbing, cyanosis, or edema  INCISION:  Dressing clean/dry/intact    LABS:  hgba1c                EKG (was personally reviewed):                                              Information Obtained from:  EHR, Physical Chart, Patient at bedside     HPI:  77yo F admitted s/p LEFT TKR today.  Has had worsening left knee pain over past 10 years, difficulty ambulating, pain sharp, worse when getting up from seated position, up to 8/10, affecting quality of life. No fevers, chills, sweats, dizziness, HA, changes in vision, cp, palpitations, sob, persistent cough, n/v/d, abd pain, dysuria, focal weakness, or calf pain.      REVIEW OF SYSTEMS:  see HPI, others negative    PAST MEDICAL & SURGICAL HISTORY:  Sickle cell trait      Hypothyroidism      COVID-19 vaccine series completed  3/21      Class 2 obesity with body mass index (BMI) of 36.0 to 36.9 in adult      Osteoarthritis of left knee      History of arthroscopy of knee  right, 2010      S/P total knee replacement, right  5/2021          SOCIAL HISTORY:  Lives: children and spouse  ETOH consumption: none  Smoking Hx: none  Illicit Drug Use:  None    Allergies    No Known Allergies    Intolerances    Home Medications:  Synthroid 100 mcg (0.1 mg) oral tablet: 1 tab(s) orally once a day (15 Ra 2022 06:28)    FAMILY HISTORY: non-contributory      PHYSICAL EXAM:  Vital Signs Last 24 Hrs  T(C): 36.8 (15 Ra 2022 06:17), Max: 36.8 (15 Ra 2022 06:17)  T(F): 98.3 (15 Ra 2022 06:17), Max: 98.3 (15 Ra 2022 06:17)  HR: 64 (15 Ra 2022 06:17) (64 - 64)  BP: 149/75 (15 Ra 2022 06:17) (149/75 - 149/75)  BP(mean): --  RR: 13 (15 Ra 2022 06:17) (13 - 13)  SpO2: 100% (15 Ra 2022 06:17) (100% - 100%)    GENERAL: NAD, well-groomed, well-developed, oriented x 3, fluent and coherent speech  EYES:  PERRLA, conjunctiva and sclera clear  ENMT: No tonsillar erythema, exudates, or enlargement; Moist mucous membranes, No lesions on oral mucosa  NECK: Supple, No JVD, No Cervical LAD, No thyromegaly, No thyroid nodules  NERVOUS SYSTEM:  No facial droop  CHEST/LUNG: Clear to auscultation bilaterally; No rales, rhonchi, wheezing, or rubs  HEART: Regular rate and rhythm; No murmurs, rubs, or gallops  ABDOMEN: Soft, Nontender, Nondistended, Bowel sounds present, No palpable masses or organomegaly, No bruits  EXTREMITIES:  2+ Peripheral Pulses, No clubbing, cyanosis, or edema  INCISION:  Dressing clean/dry/intact    LABS:  hgba1c 6.1    EKG (was personally reviewed):   Yes, NSR

## 2022-06-15 NOTE — DISCHARGE NOTE PROVIDER - NSDCMRMEDTOKEN_GEN_ALL_CORE_FT
Synthroid 100 mcg (0.1 mg) oral tablet: 1 tab(s) orally once a day   acetaminophen 500 mg oral tablet: 2 tab(s) orally every 8 hours  Aspirin Enteric Coated 81 mg oral delayed release tablet: 1 tab orally every 12 hours. Take aspirin 2 hours before Celebrex/Meloxicam.  celecoxib 200 mg oral capsule: 1 cap orally every 12 hours. Take 2 hours after Aspirin.  omeprazole 20 mg oral delayed release capsule: 1 cap(s) orally once a day   oxyCODONE 5 mg oral tablet: 1-2 tab(s) orally every 4 hours, As Needed -for moderate to severe pain MDD:6  Reference #: [id number]  Synthroid 100 mcg (0.1 mg) oral tablet: 1 tab(s) orally once a day

## 2022-06-15 NOTE — DISCHARGE NOTE PROVIDER - NSDCCPTREATMENT_GEN_ALL_CORE_FT
PRINCIPAL PROCEDURE  Procedure: Left total knee replacement  Findings and Treatment: Your new total knee requires proper care.  Your care provider is your best resource for care information.  Please follow these basic guidelines.  Use pain medication if needed as prescribed.  Ice packs are a helpful addition to your comfort.    Apply the Aircast Cryocuff over a cloth or thin towel on your operated knee for 20 minutes per hour to protect your skin and to provide benefit by reducing swelling and discomfort.  Your Physical Therapy/Occupational Therapy may include ambulation, transfers, stairs, ADL's (activities of daily living), range of motion exercises, and isometrics.  Your participation is vital to your recovery.  -Your Activity  • Weight Bearing as tolerated with rolling walker.  • Take short, frequent walks increasing the distance that you walk each day as tolerated.  • Change your position every hour to decrease pain and stiffness.  • Continue the exercises taught to you by your physical therapist.  • No driving until cleared by the doctor.  • No tub baths, hot tubs, or swimming pools until instructed by your doctor.  • Do not squat down on the floor.  • Do not kneel or twist your knee.  Keep your knee incision clean and dry.   Salves, ointments or other topical treatments are not to be used on the wound unless specifically recommended by your doctor.  You have Prineo (tape/glue), which is water resistant so you may shower and pat the wound dry with a clean towel.   Prineo removal is done in the office 2 weeks after surgery.  If you have further questions or problems call your doctor's office.

## 2022-06-16 ENCOUNTER — TRANSCRIPTION ENCOUNTER (OUTPATIENT)
Age: 77
End: 2022-06-16

## 2022-06-16 VITALS
SYSTOLIC BLOOD PRESSURE: 119 MMHG | TEMPERATURE: 98 F | DIASTOLIC BLOOD PRESSURE: 72 MMHG | HEART RATE: 72 BPM | RESPIRATION RATE: 16 BRPM | OXYGEN SATURATION: 97 %

## 2022-06-16 LAB
ANION GAP SERPL CALC-SCNC: 8 MMOL/L — SIGNIFICANT CHANGE UP (ref 5–17)
BUN SERPL-MCNC: 17 MG/DL — SIGNIFICANT CHANGE UP (ref 7–23)
CALCIUM SERPL-MCNC: 8.7 MG/DL — SIGNIFICANT CHANGE UP (ref 8.4–10.5)
CHLORIDE SERPL-SCNC: 107 MMOL/L — SIGNIFICANT CHANGE UP (ref 96–108)
CO2 SERPL-SCNC: 26 MMOL/L — SIGNIFICANT CHANGE UP (ref 22–31)
CREAT SERPL-MCNC: 1.26 MG/DL — SIGNIFICANT CHANGE UP (ref 0.5–1.3)
EGFR: 45 ML/MIN/1.73M2 — LOW
GLUCOSE SERPL-MCNC: 127 MG/DL — HIGH (ref 70–99)
HCT VFR BLD CALC: 33 % — LOW (ref 34.5–45)
HGB BLD-MCNC: 10.8 G/DL — LOW (ref 11.5–15.5)
MCHC RBC-ENTMCNC: 29.4 PG — SIGNIFICANT CHANGE UP (ref 27–34)
MCHC RBC-ENTMCNC: 32.7 GM/DL — SIGNIFICANT CHANGE UP (ref 32–36)
MCV RBC AUTO: 89.9 FL — SIGNIFICANT CHANGE UP (ref 80–100)
NRBC # BLD: 0 /100 WBCS — SIGNIFICANT CHANGE UP (ref 0–0)
PLATELET # BLD AUTO: 156 K/UL — SIGNIFICANT CHANGE UP (ref 150–400)
POTASSIUM SERPL-MCNC: 4.6 MMOL/L — SIGNIFICANT CHANGE UP (ref 3.5–5.3)
POTASSIUM SERPL-SCNC: 4.6 MMOL/L — SIGNIFICANT CHANGE UP (ref 3.5–5.3)
RBC # BLD: 3.67 M/UL — LOW (ref 3.8–5.2)
RBC # FLD: 13.7 % — SIGNIFICANT CHANGE UP (ref 10.3–14.5)
SODIUM SERPL-SCNC: 141 MMOL/L — SIGNIFICANT CHANGE UP (ref 135–145)
WBC # BLD: 11.05 K/UL — HIGH (ref 3.8–10.5)
WBC # FLD AUTO: 11.05 K/UL — HIGH (ref 3.8–10.5)

## 2022-06-16 PROCEDURE — 97530 THERAPEUTIC ACTIVITIES: CPT

## 2022-06-16 PROCEDURE — C1776: CPT

## 2022-06-16 PROCEDURE — 88305 TISSUE EXAM BY PATHOLOGIST: CPT

## 2022-06-16 PROCEDURE — 88311 DECALCIFY TISSUE: CPT

## 2022-06-16 PROCEDURE — 80048 BASIC METABOLIC PNL TOTAL CA: CPT

## 2022-06-16 PROCEDURE — 97165 OT EVAL LOW COMPLEX 30 MIN: CPT

## 2022-06-16 PROCEDURE — 97161 PT EVAL LOW COMPLEX 20 MIN: CPT

## 2022-06-16 PROCEDURE — C1713: CPT

## 2022-06-16 PROCEDURE — 85027 COMPLETE CBC AUTOMATED: CPT

## 2022-06-16 PROCEDURE — 27447 TOTAL KNEE ARTHROPLASTY: CPT | Mod: LT

## 2022-06-16 PROCEDURE — 99214 OFFICE O/P EST MOD 30 MIN: CPT

## 2022-06-16 PROCEDURE — 97116 GAIT TRAINING THERAPY: CPT

## 2022-06-16 PROCEDURE — C1889: CPT

## 2022-06-16 PROCEDURE — 36415 COLL VENOUS BLD VENIPUNCTURE: CPT

## 2022-06-16 PROCEDURE — 97110 THERAPEUTIC EXERCISES: CPT

## 2022-06-16 PROCEDURE — 73562 X-RAY EXAM OF KNEE 3: CPT

## 2022-06-16 RX ORDER — OXYCODONE HYDROCHLORIDE 5 MG/1
1 TABLET ORAL
Qty: 42 | Refills: 0
Start: 2022-06-16 | End: 2022-06-22

## 2022-06-16 RX ADMIN — PANTOPRAZOLE SODIUM 40 MILLIGRAM(S): 20 TABLET, DELAYED RELEASE ORAL at 06:24

## 2022-06-16 RX ADMIN — CELECOXIB 200 MILLIGRAM(S): 200 CAPSULE ORAL at 09:01

## 2022-06-16 RX ADMIN — Medication 1000 MILLIGRAM(S): at 06:17

## 2022-06-16 RX ADMIN — CELECOXIB 200 MILLIGRAM(S): 200 CAPSULE ORAL at 09:02

## 2022-06-16 RX ADMIN — Medication 100 MICROGRAM(S): at 06:18

## 2022-06-16 RX ADMIN — Medication 1000 MILLIGRAM(S): at 14:54

## 2022-06-16 RX ADMIN — Medication 1000 MILLIGRAM(S): at 06:23

## 2022-06-16 RX ADMIN — Medication 1 TABLET(S): at 14:54

## 2022-06-16 RX ADMIN — Medication 100 MILLIGRAM(S): at 00:47

## 2022-06-16 RX ADMIN — Medication 81 MILLIGRAM(S): at 06:18

## 2022-06-16 RX ADMIN — Medication 1000 MILLIGRAM(S): at 14:55

## 2022-06-16 NOTE — PROGRESS NOTE ADULT - SUBJECTIVE AND OBJECTIVE BOX
ORTHOPEDIC PA PROGRESS NOTE  SEGUN JERNIGAN      76y Female                                 SY 2WST 235 2                                                                                                                           POD #    1d    STATUS POST:       Procedure: Left total knee replacement               Patient seen and examined at bedside.      Current Pain Management:    acetaminophen     Tablet .. 1000 milliGRAM(s) Oral every 8 hours  celecoxib 200 milliGRAM(s) Oral every 12 hours  HYDROmorphone  Injectable 0.5 milliGRAM(s) IV Push every 3 hours PRN  ondansetron Injectable 4 milliGRAM(s) IV Push every 6 hours PRN  oxyCODONE    IR 5 milliGRAM(s) Oral every 3 hours PRN  oxyCODONE    IR 10 milliGRAM(s) Oral every 3 hours PRN      T(F): 97.5  HR: 63  BP: 121/67  RR: 16  SpO2: 96%               06-15-22 @ 07:01  -  06-16-22 @ 07:00  --------------------------------------------------------  IN:    Lactated Ringers: 275 mL    Lactated Ringers: 1100 mL    Oral Fluid: 130 mL    Sodium Chloride 0.9% Bolus: 500 mL  Total IN: 2005 mL    OUT:    Blood Loss (mL): 100 mL    Voided (mL): 1750 mL  Total OUT: 1850 mL    Total NET: 155 mL        Physical Exam :    -   Dressing changed sterile.   -   Wound with prineo C/D/I.   -   Distal Neurvascular status intact grossly.   -   Warm well perfused; capillary refill <3 seconds   -   (+)EHL/FHL   -   (+) Sensation to light touch  -   (-) Calf tenderness Bilaterally      A/P: 76y Female s/p Left total knee replacement       -   Ortho Stable  -   Pain control:  acetaminophen     Tablet .. 1000 milliGRAM(s) Oral every 8 hours  celecoxib 200 milliGRAM(s) Oral every 12 hours  HYDROmorphone  Injectable 0.5 milliGRAM(s) IV Push every 3 hours PRN  ondansetron Injectable 4 milliGRAM(s) IV Push every 6 hours PRN  oxyCODONE    IR 5 milliGRAM(s) Oral every 3 hours PRN  oxyCODONE    IR 10 milliGRAM(s) Oral every 3 hours PRN    -   Medicine to follow  -   DVT ppx:    PAS +  aspirin enteric coated: 81 milliGRAM(s) Oral  -   PT/OT OOB,  Weight bearing status: WBAT   -  Dispo:  Pending Pt and Medical Clearance  -   Prescribed Medications:  Aspirin Enteric Coated 81 mg oral delayed release tablet: 1 tab orally every 12 hours. Take aspirin 2 hours before Celebrex/Meloxicam.  celecoxib 200 mg oral capsule: 1 cap orally every 12 hours. Take 2 hours after Aspirin.  omeprazole 20 mg oral delayed release capsule: 1 cap(s) orally once a day       
Discharge medication calendar:  (ASA 81mg QDay preop)  ASA EC 81mg q12h x 4 weeks then resume ASA 81mg Qday  APAP 1000mg q8h x 2-3 weeks  Celecoxib 200mg q12h x 2-3 weeks  Omeprazole 20mg QAM x 4 weeks  Narcotic PRN  Docusate 100mg TID while taking narcotic  Miralax, Senna, or Bisacodyl PRN for treatment of constipation  
Ortho PA - Post Op Check - S/P -   Left total knee replacement      Pt alert and comfortable with no complaints, pain controlled. Ambulated with PT. Tolerating diet. Voiding.   Denies nausea     Vital Signs Last 24 Hrs  T(C): 36.3 (06-15-22 @ 15:17), Max: 36.4 (06-15-22 @ 12:30)  T(F): 97.4 (06-15-22 @ 15:17), Max: 97.5 (06-15-22 @ 12:30)  HR: 71 (06-15-22 @ 15:17) (61 - 78)  BP: 107/69 (06-15-22 @ 15:17) (107/69 - 128/75)  BP(mean): 79 (06-15-22 @ 11:30) (79 - 79)  RR: 18 (06-15-22 @ 15:17) (12 - 18)  SpO2: 96% (06-15-22 @ 15:17) (96% - 100%)  I&O's Detail    15 Ra 2022 07:01  -  15 Ra 2022 16:33  --------------------------------------------------------  IN:    Lactated Ringers: 275 mL    Lactated Ringers: 1100 mL    Sodium Chloride 0.9% Bolus: 500 mL  Total IN: 1875 mL    OUT:    Blood Loss (mL): 100 mL    Voided (mL): 300 mL  Total OUT: 400 mL    Total NET: 1475 mL        I&O's Summary    15 Ra 2022 07:01  -  15 Ra 2022 16:33  --------------------------------------------------------  IN: 1875 mL / OUT: 400 mL / NET: 1475 mL                     MEDICATIONS:acetaminophen     Tablet .. 1000 milliGRAM(s) Oral every 8 hours  ceFAZolin   IVPB 2000 milliGRAM(s) IV Intermittent every 8 hours  celecoxib 200 milliGRAM(s) Oral every 12 hours  HYDROmorphone  Injectable 0.5 milliGRAM(s) IV Push every 3 hours PRN  lactated ringers. 1000 milliLiter(s) IV Continuous <Continuous>  magnesium hydroxide Suspension 30 milliLiter(s) Oral daily PRN  multivitamin 1 Tablet(s) Oral daily  ondansetron Injectable 4 milliGRAM(s) IV Push every 6 hours PRN  oxyCODONE    IR 5 milliGRAM(s) Oral every 3 hours PRN  oxyCODONE    IR 10 milliGRAM(s) Oral every 3 hours PRN  pantoprazole    Tablet 40 milliGRAM(s) Oral before breakfast  polyethylene glycol 3350 17 Gram(s) Oral at bedtime  senna 2 Tablet(s) Oral at bedtime    Anticoagulation:      Antibiotics:   ceFAZolin   IVPB 2000 milliGRAM(s) IV Intermittent every 8 hours      Pain medications:   acetaminophen     Tablet .. 1000 milliGRAM(s) Oral every 8 hours  celecoxib 200 milliGRAM(s) Oral every 12 hours  HYDROmorphone  Injectable 0.5 milliGRAM(s) IV Push every 3 hours PRN  ondansetron Injectable 4 milliGRAM(s) IV Push every 6 hours PRN  oxyCODONE    IR 5 milliGRAM(s) Oral every 3 hours PRN  oxyCODONE    IR 10 milliGRAM(s) Oral every 3 hours PRN          PE:  Knee-primary surgical bandage dry and intact. Feet mobile and sensate. EHLs/ant.tibs. 5/5  PAS on LE's. Calves soft and nontender. Cryocuff in place    A/P: Ortho stable post-op  - Continue post-op orders; pain management with Celebrex, Oxycodone, Dilaudid, Acetaminophen  - Check labs today and in A.M.  - DVT prevention with Aspirin 81mg q 12 h   - PT /OT for OOB, full WBAT  - Medical consult  -Discharge planning  -Will continue to monitor closely with attendings.
INTERVAL HPI/OVERNIGHT EVENTS:   Patient seen and examined.  Eating, voiding, ambulated into hallway with PT.  No fevers, chills, sweats, dizziness, HA, changes in vision, cp, palpitations, sob, persistent cough, n/v/d, abd pain, dysuria, focal weakness, or calf pain.     REVIEW OF SYSTEMS:  See HPI,  all others negative    PHYSICAL EXAM:  Vital Signs Last 24 Hrs  T(C): 36.4 (16 Jun 2022 03:00), Max: 36.5 (15 Ra 2022 19:12)  T(F): 97.5 (16 Jun 2022 03:00), Max: 97.7 (15 Ra 2022 19:12)  HR: 63 (16 Jun 2022 03:00) (59 - 78)  BP: 121/67 (16 Jun 2022 03:00) (107/69 - 132/79)  BP(mean): 79 (15 Ra 2022 11:30) (79 - 79)  RR: 16 (16 Jun 2022 03:00) (12 - 18)  SpO2: 96% (16 Jun 2022 03:00) (96% - 100%)    GENERAL: NAD, well-groomed, well-developed, awake, alert, oriented x 3, fluent and coherent speech  EYES: EOMI, PERRLA, conjunctiva and sclera clear  ENMT: No tonsillar erythema, exudates, or enlargement; Moist mucous membranes, No lesions seen on oral mucosa  NECK: Supple, No JVD, No Cervical LAD   NERVOUS SYSTEM:  Good concentration; Moving all 4 extremities against gravity; No gross sensory deficits, No facial droop  CHEST/LUNG: Clear to auscultation bilaterally with good air entry; No rales, rhonchi, wheezing, or rubs  HEART: Regular rate and rhythm; No murmurs, rubs, or gallops  ABDOMEN: Soft, Nontender, Nondistended, Bowel sounds present, No palpable masses or organomegaly, No bruits  EXTREMITIES:  2+ Peripheral Pulses, No clubbing, cyanosis, or edema, no calf tenderness in either leg  WOUND: below average edema/eythema    Diagnostic Testing:                        10.8   11.05 )-----------( 156      ( 16 Jun 2022 06:00 )             33.0     16 Jun 2022 06:00    141    |  107    |  17     ----------------------------<  127    4.6     |  26     |  1.26     Ca    8.7        16 Jun 2022 06:00

## 2022-06-16 NOTE — DISCHARGE NOTE NURSING/CASE MANAGEMENT/SOCIAL WORK - NSDCPEFALRISK_GEN_ALL_CORE
For information on Fall & Injury Prevention, visit: [email or web address] OR  [email or web address] OR  https://www.cdc.gov/steadi/patient.html

## 2022-06-16 NOTE — DISCHARGE NOTE NURSING/CASE MANAGEMENT/SOCIAL WORK - NSSCNAMETXT_GEN_ALL_CORE
[facility] (formerly [facility])  [address] [place] [address]

## 2022-06-16 NOTE — PROGRESS NOTE ADULT - REASON FOR ADMISSION
Left total knee replacement
Patient is a 76y old  Female who presents with a chief complaint of Left total knee replacement (15 Ra 2022 16:33)

## 2022-06-16 NOTE — DISCHARGE NOTE NURSING/CASE MANAGEMENT/SOCIAL WORK - PATIENT PORTAL LINK FT
You can access the FollowMyHealth Patient Portal offered by [facility] by registering at the following website: [email or web address]. By joining [facility]’s FollowMyHealth portal, you will also be able to view your health information using other applications (apps) compatible with our system.

## 2022-06-16 NOTE — PROGRESS NOTE ADULT - ASSESSMENT
POD#1 s/p LEFT TKR  - Pain control  - Bowel regimen  - PT/OT  - Incentive spirometry  - VTE PPx - ASA BID  - stable for discharge from medical perspective    Prediabetes  - monitor glucose on BMP  - diet/portion control to reduce risk of progression    Hypothyroidism  - continue synthroid

## 2022-07-01 ENCOUNTER — APPOINTMENT (OUTPATIENT)
Dept: ORTHOPEDIC SURGERY | Facility: CLINIC | Age: 77
End: 2022-07-01

## 2022-07-01 VITALS
WEIGHT: 236 LBS | BODY MASS INDEX: 37.04 KG/M2 | DIASTOLIC BLOOD PRESSURE: 72 MMHG | HEART RATE: 82 BPM | HEIGHT: 67 IN | SYSTOLIC BLOOD PRESSURE: 118 MMHG

## 2022-07-01 PROCEDURE — 73562 X-RAY EXAM OF KNEE 3: CPT | Mod: LT

## 2022-07-01 PROCEDURE — 99024 POSTOP FOLLOW-UP VISIT: CPT

## 2022-07-01 RX ORDER — AMOXICILLIN 500 MG/1
500 TABLET, FILM COATED ORAL
Qty: 8 | Refills: 4 | Status: ACTIVE | COMMUNITY
Start: 2022-07-01 | End: 1900-01-01

## 2022-07-01 RX ORDER — DICLOFENAC SODIUM 75 MG/1
75 TABLET, DELAYED RELEASE ORAL
Qty: 28 | Refills: 1 | Status: ACTIVE | COMMUNITY
Start: 2022-07-01 | End: 1900-01-01

## 2022-07-01 RX ORDER — TRAMADOL HYDROCHLORIDE 50 MG/1
50 TABLET, COATED ORAL
Qty: 40 | Refills: 0 | Status: ACTIVE | COMMUNITY
Start: 2022-07-01 | End: 1900-01-01

## 2022-08-30 ENCOUNTER — APPOINTMENT (OUTPATIENT)
Dept: ORTHOPEDIC SURGERY | Facility: CLINIC | Age: 77
End: 2022-08-30

## 2022-08-30 VITALS
DIASTOLIC BLOOD PRESSURE: 83 MMHG | SYSTOLIC BLOOD PRESSURE: 133 MMHG | WEIGHT: 235 LBS | HEART RATE: 72 BPM | BODY MASS INDEX: 36.88 KG/M2 | HEIGHT: 67 IN

## 2022-08-30 PROCEDURE — 99024 POSTOP FOLLOW-UP VISIT: CPT

## 2022-08-30 PROCEDURE — 73562 X-RAY EXAM OF KNEE 3: CPT | Mod: LT

## 2022-09-06 ENCOUNTER — OUTPATIENT (OUTPATIENT)
Dept: OUTPATIENT SERVICES | Facility: HOSPITAL | Age: 77
LOS: 1 days | End: 2022-09-06
Payer: COMMERCIAL

## 2022-09-06 VITALS
WEIGHT: 227.96 LBS | SYSTOLIC BLOOD PRESSURE: 120 MMHG | RESPIRATION RATE: 15 BRPM | HEART RATE: 80 BPM | DIASTOLIC BLOOD PRESSURE: 79 MMHG | HEIGHT: 66.5 IN | OXYGEN SATURATION: 98 % | TEMPERATURE: 99 F

## 2022-09-06 DIAGNOSIS — Z96.651 PRESENCE OF RIGHT ARTIFICIAL KNEE JOINT: Chronic | ICD-10-CM

## 2022-09-06 DIAGNOSIS — M25.662 STIFFNESS OF LEFT KNEE, NOT ELSEWHERE CLASSIFIED: ICD-10-CM

## 2022-09-06 DIAGNOSIS — Z96.652 PRESENCE OF LEFT ARTIFICIAL KNEE JOINT: Chronic | ICD-10-CM

## 2022-09-06 DIAGNOSIS — Z01.818 ENCOUNTER FOR OTHER PREPROCEDURAL EXAMINATION: ICD-10-CM

## 2022-09-06 DIAGNOSIS — Z98.890 OTHER SPECIFIED POSTPROCEDURAL STATES: Chronic | ICD-10-CM

## 2022-09-06 LAB
ALBUMIN SERPL ELPH-MCNC: 3.7 G/DL — SIGNIFICANT CHANGE UP (ref 3.3–5)
ALP SERPL-CCNC: 65 U/L — SIGNIFICANT CHANGE UP (ref 30–120)
ALT FLD-CCNC: 25 U/L DA — SIGNIFICANT CHANGE UP (ref 10–60)
ANION GAP SERPL CALC-SCNC: 7 MMOL/L — SIGNIFICANT CHANGE UP (ref 5–17)
AST SERPL-CCNC: 20 U/L — SIGNIFICANT CHANGE UP (ref 10–40)
BILIRUB SERPL-MCNC: 0.3 MG/DL — SIGNIFICANT CHANGE UP (ref 0.2–1.2)
BUN SERPL-MCNC: 16 MG/DL — SIGNIFICANT CHANGE UP (ref 7–23)
CALCIUM SERPL-MCNC: 9 MG/DL — SIGNIFICANT CHANGE UP (ref 8.4–10.5)
CHLORIDE SERPL-SCNC: 106 MMOL/L — SIGNIFICANT CHANGE UP (ref 96–108)
CO2 SERPL-SCNC: 31 MMOL/L — SIGNIFICANT CHANGE UP (ref 22–31)
CREAT SERPL-MCNC: 1.11 MG/DL — SIGNIFICANT CHANGE UP (ref 0.5–1.3)
EGFR: 52 ML/MIN/1.73M2 — LOW
GLUCOSE SERPL-MCNC: 95 MG/DL — SIGNIFICANT CHANGE UP (ref 70–99)
HCT VFR BLD CALC: 36.9 % — SIGNIFICANT CHANGE UP (ref 34.5–45)
HGB BLD-MCNC: 12.2 G/DL — SIGNIFICANT CHANGE UP (ref 11.5–15.5)
MCHC RBC-ENTMCNC: 29.3 PG — SIGNIFICANT CHANGE UP (ref 27–34)
MCHC RBC-ENTMCNC: 33.1 GM/DL — SIGNIFICANT CHANGE UP (ref 32–36)
MCV RBC AUTO: 88.5 FL — SIGNIFICANT CHANGE UP (ref 80–100)
NRBC # BLD: 0 /100 WBCS — SIGNIFICANT CHANGE UP (ref 0–0)
PLATELET # BLD AUTO: 202 K/UL — SIGNIFICANT CHANGE UP (ref 150–400)
POTASSIUM SERPL-MCNC: 4.2 MMOL/L — SIGNIFICANT CHANGE UP (ref 3.5–5.3)
POTASSIUM SERPL-SCNC: 4.2 MMOL/L — SIGNIFICANT CHANGE UP (ref 3.5–5.3)
PROT SERPL-MCNC: 7.5 G/DL — SIGNIFICANT CHANGE UP (ref 6–8.3)
RBC # BLD: 4.17 M/UL — SIGNIFICANT CHANGE UP (ref 3.8–5.2)
RBC # FLD: 14.1 % — SIGNIFICANT CHANGE UP (ref 10.3–14.5)
SODIUM SERPL-SCNC: 144 MMOL/L — SIGNIFICANT CHANGE UP (ref 135–145)
WBC # BLD: 5.38 K/UL — SIGNIFICANT CHANGE UP (ref 3.8–10.5)
WBC # FLD AUTO: 5.38 K/UL — SIGNIFICANT CHANGE UP (ref 3.8–10.5)

## 2022-09-06 PROCEDURE — 36415 COLL VENOUS BLD VENIPUNCTURE: CPT

## 2022-09-06 PROCEDURE — 85027 COMPLETE CBC AUTOMATED: CPT

## 2022-09-06 PROCEDURE — G0463: CPT

## 2022-09-06 PROCEDURE — 80053 COMPREHEN METABOLIC PANEL: CPT

## 2022-09-06 NOTE — H&P PST ADULT - NSICDXPASTMEDICALHX_GEN_ALL_CORE_FT
PAST MEDICAL HISTORY:  Class 2 obesity with body mass index (BMI) of 36.0 to 36.9 in adult     Hypothyroidism     Osteoarthritis of left knee     Sickle cell trait

## 2022-09-06 NOTE — H&P PST ADULT - NSICDXPASTSURGICALHX_GEN_ALL_CORE_FT
PAST SURGICAL HISTORY:  History of arthroscopy of knee right, 2010    S/P total knee arthroplasty, left     S/P total knee replacement, right 5/2021

## 2022-09-06 NOTE — H&P PST ADULT - HISTORY OF PRESENT ILLNESS
this is a 78 y/o who had left knee replacement 6/22; her ROM of left knee is not where it should be at this point, so she will have manipulation of left knee under anesthesia

## 2022-09-06 NOTE — H&P PST ADULT - PROBLEM SELECTOR PLAN 1
left knee manipulation under anesthesia; to have covid swab at outside [facility] on 9/9/22, preop instructions given, to go for medical clearance

## 2022-09-09 ENCOUNTER — APPOINTMENT (OUTPATIENT)
Dept: CARDIOLOGY | Facility: CLINIC | Age: 77
End: 2022-09-09

## 2022-09-09 VITALS
WEIGHT: 230 LBS | HEART RATE: 79 BPM | DIASTOLIC BLOOD PRESSURE: 86 MMHG | SYSTOLIC BLOOD PRESSURE: 142 MMHG | HEIGHT: 67 IN | BODY MASS INDEX: 36.1 KG/M2 | OXYGEN SATURATION: 96 %

## 2022-09-09 DIAGNOSIS — Z01.810 ENCOUNTER FOR PREPROCEDURAL CARDIOVASCULAR EXAMINATION: ICD-10-CM

## 2022-09-09 DIAGNOSIS — R94.31 ABNORMAL ELECTROCARDIOGRAM [ECG] [EKG]: ICD-10-CM

## 2022-09-09 PROCEDURE — 99213 OFFICE O/P EST LOW 20 MIN: CPT

## 2022-09-09 NOTE — PHYSICAL EXAM
[Normal Conjunctiva] : normal conjunctiva [No Carotid Bruit] : no carotid bruit [Soft] : abdomen soft [Non Tender] : non-tender [Normal Bowel Sounds] : normal bowel sounds [No Edema] : no edema [No Cyanosis] : no cyanosis [Normal] : alert and oriented, normal memory [de-identified] : walks slowly.

## 2022-09-09 NOTE — HISTORY OF PRESENT ILLNESS
[FreeTextEntry1] : Aviva Moffett is a 76 year old female with history of hypothyroidism and abnormal EKG comes for pre procedural  cardiac evaluation for manipulation of left knee under Anaesthesia.  . Denies any chest pain or palpitations. Denies any  shortness of breath on exertion. Can not assess functional capacity as she can not walk much because of joint pains.

## 2022-09-09 NOTE — DISCUSSION/SUMMARY
[FreeTextEntry1] : In a summary Aviva Moffett is an elderly female with Abnormal EKG showing anterolateral T wave inversions and Pre- procedural  cardiovascular exam for left knee manipulation under anaesthesia,  echo done showed normal LV systolic function and wall motion. Echo results explained. Nuclear stress test done 5/2021 was negative for ischemia. With normal cardiac testing and asymptomatic cardiac wise, will be low risk cardiac wise for left knee manipulation. Explained Ms. Chicas in detail.

## 2022-09-10 LAB — SARS-COV-2 N GENE NPH QL NAA+PROBE: NOT DETECTED

## 2022-09-12 ENCOUNTER — TRANSCRIPTION ENCOUNTER (OUTPATIENT)
Age: 77
End: 2022-09-12

## 2022-09-12 ENCOUNTER — APPOINTMENT (OUTPATIENT)
Dept: ORTHOPEDIC SURGERY | Facility: HOSPITAL | Age: 77
End: 2022-09-12

## 2022-09-12 ENCOUNTER — OUTPATIENT (OUTPATIENT)
Dept: OUTPATIENT SERVICES | Facility: HOSPITAL | Age: 77
LOS: 1 days | End: 2022-09-12
Payer: COMMERCIAL

## 2022-09-12 ENCOUNTER — NON-APPOINTMENT (OUTPATIENT)
Age: 77
End: 2022-09-12

## 2022-09-12 VITALS
OXYGEN SATURATION: 100 % | RESPIRATION RATE: 17 BRPM | TEMPERATURE: 98 F | SYSTOLIC BLOOD PRESSURE: 148 MMHG | DIASTOLIC BLOOD PRESSURE: 81 MMHG | HEART RATE: 65 BPM

## 2022-09-12 VITALS
RESPIRATION RATE: 18 BRPM | HEIGHT: 67 IN | HEART RATE: 72 BPM | WEIGHT: 228.62 LBS | DIASTOLIC BLOOD PRESSURE: 78 MMHG | SYSTOLIC BLOOD PRESSURE: 128 MMHG | TEMPERATURE: 98 F | OXYGEN SATURATION: 99 %

## 2022-09-12 DIAGNOSIS — M25.662 STIFFNESS OF LEFT KNEE, NOT ELSEWHERE CLASSIFIED: ICD-10-CM

## 2022-09-12 DIAGNOSIS — Z96.651 PRESENCE OF RIGHT ARTIFICIAL KNEE JOINT: Chronic | ICD-10-CM

## 2022-09-12 DIAGNOSIS — Z98.890 OTHER SPECIFIED POSTPROCEDURAL STATES: Chronic | ICD-10-CM

## 2022-09-12 DIAGNOSIS — Z96.652 PRESENCE OF LEFT ARTIFICIAL KNEE JOINT: Chronic | ICD-10-CM

## 2022-09-12 PROCEDURE — 27570 FIXATION OF KNEE JOINT: CPT | Mod: 79,LT

## 2022-09-12 PROCEDURE — 97161 PT EVAL LOW COMPLEX 20 MIN: CPT

## 2022-09-12 PROCEDURE — 27570 FIXATION OF KNEE JOINT: CPT | Mod: LT

## 2022-09-12 RX ORDER — SODIUM CHLORIDE 9 MG/ML
1000 INJECTION, SOLUTION INTRAVENOUS
Refills: 0 | Status: DISCONTINUED | OUTPATIENT
Start: 2022-09-12 | End: 2022-09-13

## 2022-09-12 RX ORDER — HYDROMORPHONE HYDROCHLORIDE 2 MG/ML
0.5 INJECTION INTRAMUSCULAR; INTRAVENOUS; SUBCUTANEOUS
Refills: 0 | Status: DISCONTINUED | OUTPATIENT
Start: 2022-09-12 | End: 2022-09-13

## 2022-09-12 RX ORDER — TRAMADOL HYDROCHLORIDE 50 MG/1
1 TABLET ORAL
Qty: 30 | Refills: 0
Start: 2022-09-12

## 2022-09-12 RX ORDER — HYDROMORPHONE HYDROCHLORIDE 2 MG/ML
0.25 INJECTION INTRAMUSCULAR; INTRAVENOUS; SUBCUTANEOUS
Refills: 0 | Status: DISCONTINUED | OUTPATIENT
Start: 2022-09-12 | End: 2022-09-13

## 2022-09-12 RX ORDER — OXYCODONE HYDROCHLORIDE 5 MG/1
5 TABLET ORAL ONCE
Refills: 0 | Status: DISCONTINUED | OUTPATIENT
Start: 2022-09-12 | End: 2022-09-13

## 2022-09-12 RX ORDER — ONDANSETRON 8 MG/1
4 TABLET, FILM COATED ORAL ONCE
Refills: 0 | Status: DISCONTINUED | OUTPATIENT
Start: 2022-09-12 | End: 2022-09-13

## 2022-09-12 RX ORDER — LEVOTHYROXINE SODIUM 125 MCG
1 TABLET ORAL
Qty: 0 | Refills: 0 | DISCHARGE

## 2022-09-12 RX ADMIN — SODIUM CHLORIDE 75 MILLILITER(S): 9 INJECTION, SOLUTION INTRAVENOUS at 08:14

## 2022-09-12 NOTE — ASU DISCHARGE PLAN (ADULT/PEDIATRIC) - DISCHARGE TO
Date of Service


March 11, 2022


 





Assessment & Plan


(1) Acute blood loss anemia: 


      Plan: 


Hb 2.9 on admission (uncertain if this is real)


-s/p 4 units packed RBC, repeat Hb 9.3 --> down trended to 7.9 today


-No active bleed currently.  Repeat CBC daily.  Transfuse if Hb< 7 or if 

symptoms


 





(2) Gross hematuria: 


      Plan: 


-Secondary to radiation cystitis with history of prostate cancer


-Appreciate Urology input.  Manual clot evacution bedside 3/10.  


-UA reviewed.  Urine culture so far negative


(3) LARA (acute kidney injury): 


      Plan: 


Likely due to dehydration


improved


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 


(5) H/O ETOH abuse: 


      Plan: 


Continue folic acid and thiamine


(6) Prostate cancer: 


(7) Cirrhosis: 


      Plan: 


Appears compensated


(8) Bacteremia: 


      Plan: 


start Vancomcyin.  Will repeat blood cultures tomorrow.  No evidence of sepsis 

or systemic infection.  Uncertain if positive blood cultures are contaminant.  

Follow up final culture result


      Plan: 


DVT PPX


-SCDs for now





Disposition


-Per CM, Adult Protective Services is involved in his case and has a court order

Monday or Tuesday next week.  He will need to remain in the hospital in the 

meantime, they are concerned he is not safe to return home.


Admission and Anticipated Discharge Date


Admission Date: 


March 9, 2022








Subjective


Feels well


CBI has cleared up


Remains afebrile 





Physical Exam








Physical Exam:   Appears older than stated age, appears disheveled no acute 

distress


 


Respiratory:   breathing comfortably on room air, no wheezing/rhonchi/rales


 


Cardiovascular:   regular rate and rhythm, no murmurs/rubs/gallops


 


Gastrointestinal (Abdomen):   soft, non tender, non distended


 


Musculoskeletal:   no edema


 


Neurologic:   awake, alert, spontaneously moving extremities


 


Genitourinary:   CBI draining clear urine








Results & Data


Results & Data ([facility])


Vital Signs (Past 12 Hours)


                                   Vital Signs











  Temp Pulse Pulse Pulse Resp BP Pulse Ox


 


 03/11/22 16:02  36.9 C   85   19  98/64 L  98


 


 03/11/22 14:55   91 H     


 


 03/11/22 12:04  37.4 C    91 H  18  111/64  100


 


 03/11/22 07:55  36.7 C   84   18  100/61  99


 


 03/11/22 07:04   91 H     











Laboratory Results





                                    Short CBC











  03/11/22 Range/Units





  08:01 


 


WBC  5.79  (4.8-10.8)  K/uL


 


Hgb  7.9 L  (14.0-18.0)  g/dL


 


Hct  23.8 L  (42-52)  %


 


Plt Count  96 L  (130-400)  K/uL








                                       BMP











  03/11/22





  08:01


 


Sodium  136


 


Potassium  3.2 L


 


Chloride  109 H


 


Carbon Dioxide  22


 


BUN  16


 


Creatinine  0.93


 


Glucose  104 H


 


Calcium  6.7 L








                                 Liver Function











  03/11/22 Range/Units





  08:01 


 


Total Bilirubin  2.2 H  (0.2-1.0)  mg/dl


 


AST  28  (13-39)  U/L


 


ALT  15  (7-52)  U/L


 


Alkaline Phosphatase  122 H  ()  U/L


 


Albumin  2.5 L  (3.4-5.0)  gm/dl








                                      Urine











  03/10/22 Range/Units





  17:05 


 


Urine Color  Red  


 


Urine Appearance  Turbid A  (Clear)  


 


Urine pH  6.0  (4.5-7.5)  


 


Ur Specific Gravity  1.015  (1.000-1.030)  


 


Urine Protein  2+ H  (Negative)  


 


Urine Glucose (UA)  Negative  (Negative)  











Medications Administered





                          Current Inpatient Medications





Belladonna Alkaloids/Opium (Belladonna/Opium Supp 60 Mg Supp)  60 mg NV Q6 PRN


   PRN Reason: Bladder pain


   Stop: 03/24/22 12:06


   Last Admin: 03/10/22 12:30 Dose:  60 mg


   Documented by: 


Calcium Carbonate (Calcium Carbonate 1,250 Mg/5 Ml Udc)  1,250 mg PO BID ANDRE


   Stop: 04/10/22 09:29


   Last Admin: 03/11/22 10:15 Dose:  1,250 mg


   Documented by: 


Fluticasone Propionate (Fluticasone Propionate Na Spr 16 Gm Btl)  2 sprays NA 

DAILY ANDRE


   Stop: 04/09/22 08:59


   Last Admin: 03/11/22 07:48 Dose:  2 sprays


   Documented by: 


Folic Acid (Folic Acid 1 Mg Tab)  1 mg PO QAM ANDRE


   Stop: 04/09/22 08:59


   Last Admin: 03/11/22 07:49 Dose:  1 mg


   Documented by: 


Vancomycin HCl 750 mg/ Sodium (Chloride)  265 mls @ 200 mls/hr IV Q12H ANDRE


   Stop: 03/25/22 21:59


Miscellaneous (Remove Nicoderm Patch)  1 ea N/A DAILY@0859 [facility]


   Stop: 04/09/22 08:58


   Last Admin: 03/11/22 10:15 Dose:  1 ea


   Documented by: 


Miscellaneous Information (Vancomycin Consult Active)  1 ea N/A UD PRN


   PRN Reason: Consult


   Stop: 04/10/22 12:16


Nicotine (Nicotine 21 Mg/24 Hr Tdsy)  21 mg TD QAM [facility]


   Stop: 04/09/22 08:59


   Last Admin: 03/11/22 10:31 Dose:  21 mg


   Documented by: 


Thiamine HCl (Thiamine Hcl 100 Mg Tab)  100 mg PO QAM [facility]


   Stop: 04/09/22 08:59


   Last Admin: 03/11/22 07:49 Dose:  100 mg


   Documented by: 


Trazodone HCl (Trazodone Hcl 100 Mg Tab)  300 mg PO HS [facility]


   Stop: 04/08/22 21:37


   Last Admin: 03/10/22 20:55 Dose:  300 mg


   Documented by: Home

## 2022-09-12 NOTE — ASU DISCHARGE PLAN (ADULT/PEDIATRIC) - ASU DC SPECIAL INSTRUCTIONSFT
Begin outpatient Physical Therapy today or early tomorrow morning.  Use cane to assist walking as needed.  Apply ice paks to both knees for 30 mon. every 3 hours and after knee exercises  Follow instruction by Physical Therapist for knee exercises at home.  See Dr. Erick Robert ]  in the office in 4-6 weeks for follow up visit.  Call surgeon for severe pain, fever , severe swelling

## 2022-09-12 NOTE — ASU PATIENT PROFILE, ADULT - FALL HARM RISK - UNIVERSAL INTERVENTIONS
Bed in lowest position, wheels locked, appropriate side rails in place/Call bell, personal items and telephone in reach/Instruct patient to call for assistance before getting out of bed or chair/Non-slip footwear when patient is out of bed/[place] to call system/Physically safe environment - no spills, clutter or unnecessary equipment/Purposeful Proactive Rounding/Room/bathroom lighting operational, light cord in reach

## 2022-09-12 NOTE — ASU DISCHARGE PLAN (ADULT/PEDIATRIC) - NS MD DC FALL RISK RISK
For information on Fall & Injury Prevention, visit: [email or web address] OR  [email or web address] OR  https://www.cdc.gov/steadi/patient.html

## 2022-09-12 NOTE — ASU PATIENT PROFILE, ADULT - NS PRO MODE OF ARRIVAL
[FreeTextEntry1] : 56 y/o HF, from [facility], poor historian and poor comprehension:\par \par CPE: blood and UA today.\par \par HIV screening: refused\par \par HC screenin negative\par \par HTN/ Atypical chest pain:\par -on Losartan-HCTZ 50-12.5mg.\par -EKG: wnl.\par \par GERD: on omeprazole prn. Diet advise.\par \par Obesity: TLC. Diet counseling and guidance. Counseling was provided in diet and exercise. TLC d/w pt in details, regarding benefits of low calories and portion control. \par \par Microscopic hematuria:\par -seen by urology\par \par Abnormal pelvic USG: thickened endometrium/ uterine Myoma:\par -Gyn eval.\par \par \par Gyn: PAP up to date\par -USG done with urology.: Thickened endometrium/ Myomatous uterus: needs GYn f/up.\par \par Mammo: referral\par Colonoscopy: .\par \par  ambulatory

## 2022-09-12 NOTE — PHYSICAL THERAPY INITIAL EVALUATION ADULT - PLANNED THERAPY INTERVENTIONS, PT EVAL
Pt seen for ambulation training /stair education in PACU WBAT left LE with a cane. Pt is independent with transfers, ambulation, stairs WBAT left LE with Cane. Pt has own cane at home. Pt is cleared from PT

## 2022-09-12 NOTE — ASU DISCHARGE PLAN (ADULT/PEDIATRIC) - CARE PROVIDER_API CALL
Reji Robert)  Orthopedic Surgery  [address], [address]  [place] [address]  Phone: [phone]  Fax: [phone]  Follow Up Time:

## 2022-10-24 ENCOUNTER — APPOINTMENT (OUTPATIENT)
Dept: ORTHOPEDIC SURGERY | Facility: CLINIC | Age: 77
End: 2022-10-24

## 2022-10-24 ENCOUNTER — NON-APPOINTMENT (OUTPATIENT)
Age: 77
End: 2022-10-24

## 2022-10-24 VITALS — HEART RATE: 73 BPM | DIASTOLIC BLOOD PRESSURE: 82 MMHG | SYSTOLIC BLOOD PRESSURE: 135 MMHG

## 2022-10-24 PROCEDURE — 99213 OFFICE O/P EST LOW 20 MIN: CPT

## 2022-10-24 PROCEDURE — 73562 X-RAY EXAM OF KNEE 3: CPT | Mod: LT

## 2022-10-24 NOTE — HISTORY OF PRESENT ILLNESS
[Chills] : no chills [Constipation] : no constipation [Diarrhea] : no diarrhea [Dysuria] : no dysuria [Fever] : no fever [Vomiting] : no vomiting [Nausea] : no nausea [Clean/Dry/Intact] : clean, dry and intact [Healed] : healed [Erythema] : not erythematous [Discharge] : absent of discharge [Swelling] : not swollen [Dehiscence] : not dehisced [Neuro Intact] : an unremarkable neurological exam [Vascular Intact] : ~T peripheral vascular exam normal [Doing Well] : is doing well [Negative Jeannette's] : maneuvers demonstrated a negative Jeannette's sign [Excellent Pain Control] : has excellent pain control [No Sign of Infection] : is showing no signs of infection [de-identified] : Status post left total knee replacement on September 12, 2022. [de-identified] : Patient is a 77-year-old female who presents today for an evaluation regarding her left knee.  She is status post left total knee replacement on September 12, 2022.  Overall the patient states she is doing very well with no real complaints of any pain or discomfort.  She states that she is going to physical therapy twice a week.  And is improving.  However she does state that with periods of rest such as in the morning and when after prolonged sitting.  She does get an increase in discomfort with stiffness.  Currently she states that the pain is a 3 on a scale of 1-10.  Denies any new or recent injury. [de-identified] : On physical examination of the left knee.  Patient is status post left total knee replacement.  There is a well-healed surgical scar with no evidence of infection superficial or deep.  There is no redness swelling heat or discharge noted.  She has good range of motion.  She is able to fully extend the knee and flexion to at least 110 degrees.  There is no evidence of ligamentous laxity.  No evidence of any muscle atrophy.  No evidence of any motor or sensory deficit.  Patient has good distal pulses with no calf tenderness. [de-identified] : X-rays of the left knee reveal status post left total knee replacement.  The hardware is in place and shows excellent alignment as well as fixation.  There is no evidence of any fracture dislocation or loosening of any hardware. [de-identified] : Status post left total knee replacement on September 12, 2022 [de-identified] : Plan for the patient is to continue with the present level of activities.  This includes a good home exercise program along with ice pack/moist heat and anti-inflammatories.  Patient was given a prescription for physical therapy to attend 2-3 times a week for the next 6 to 8 weeks.  She is advised to return back to the office for her 3-month postoperative visit with x-rays.  However if she experiences any increase in pain or discomfort to notify the office.

## 2022-11-29 ENCOUNTER — APPOINTMENT (OUTPATIENT)
Dept: ORTHOPEDIC SURGERY | Facility: CLINIC | Age: 77
End: 2022-11-29

## 2022-11-29 VITALS — SYSTOLIC BLOOD PRESSURE: 125 MMHG | DIASTOLIC BLOOD PRESSURE: 83 MMHG | HEART RATE: 70 BPM

## 2022-11-29 DIAGNOSIS — Z96.652 PRESENCE OF LEFT ARTIFICIAL KNEE JOINT: ICD-10-CM

## 2022-11-29 DIAGNOSIS — M25.662 STIFFNESS OF LEFT KNEE, NOT ELSEWHERE CLASSIFIED: ICD-10-CM

## 2022-11-29 PROCEDURE — 99214 OFFICE O/P EST MOD 30 MIN: CPT

## 2022-11-29 PROCEDURE — 73562 X-RAY EXAM OF KNEE 3: CPT | Mod: LT

## 2022-12-02 PROBLEM — M25.662 STIFFNESS OF LEFT KNEE: Status: ACTIVE | Noted: 2022-08-30

## 2022-12-02 PROBLEM — Z96.652 STATUS POST LEFT KNEE REPLACEMENT: Status: ACTIVE | Noted: 2022-07-01

## 2023-01-11 NOTE — ASSESSMENT
1/11/2023    RN JESSICA reviewed chart and noted ENT appointment has not been rescheduled  RN JESSICA attempted to outreach to [address] on phone number [phone] and message states the subscriber is not in service  RN JESSICA outreached to [address] on phone number [phone] and introduced self,explained my role and offered assistance with complex care needs  RN JESSICA offered assistance with scheduling Xu's appointment,mother stated she would schedule his needed appointments  She reports she has the numbers to schedule  Mother reports she hs transportation and works  RN CM will outreach in 2 weeks to assess progress on scheduling appointments  If no progress RN JESSICA will speak with the Providers  Future appointments:     Well 6/14/2022 due 6/2023     Neurology 11/10/2021 follow up PRN ADHD re-referred at [facility]     ENT [facility] 11/11/2022 at 200 am  No show     Audiology  12/6/2021     Speech -school IEP     Dental    Developmental Peds Mother reports she is working on the packet  [FreeTextEntry1] : End-stage degenerative arthritis bilateral knees

## 2023-04-20 NOTE — BEGINNING OF VISIT
Left message for patient's mother to see if they would like to do the Egg blood test.    [Patient] : patient [Family Member] : family member [Spouse] : spouse

## 2023-06-12 NOTE — PRE-OP CHECKLIST - PATIENT SENT TO
UPCOMING APPTS  With Family Medicine (DAVY Ash)  06/22/2023 at 11:30 AM  LAST OFFICE VISIT - THIS DEPT  3/16/2023 Norris Nettles APRN  
operating room

## 2023-09-21 ASSESSMENT — KOOS JR
BENDING TO THE FLOOR TO PICK UP OBJECT: MODERATE
IMPORTED FORM: YES
RISING FROM SITTING: MODERATE
TWISING OR PIVOTING ON KNEE: SEVERE
GOING UP OR DOWN STAIRS: SEVERE
HOW SEVERE IS YOUR KNEE STIFFNESS AFTER FIRST WAKING IN MORNING: SEVERE
STRAIGHTENING KNEE FULLY: SEVERE
KOOS JR RAW SCORE: 19
IMPORTED KOOS JR SCORE: 19.0
STANDING UPRIGHT: SEVERE

## 2023-09-26 NOTE — H&P PST ADULT - PATIENT'S GENDER IDENTITY
Female
OB Attending    P1 admitted for repeat C/S. Written informed consent obtained. Accepts blood.    JOSE Caballero-MD Porfirio

## 2023-09-28 NOTE — H&P PST ADULT - SKIN/BREAST COMMENTS
feet peeling Electrodesiccation Text: The wound bed was treated with electrodesiccation after the biopsy was performed.

## 2023-10-13 NOTE — ASU PREOP CHECKLIST - WEIGHT IN KG
103.7 Minocycline Counseling: Patient advised regarding possible photosensitivity and discoloration of the teeth, skin, lips, tongue and gums.  Patient instructed to avoid sunlight, if possible.  When exposed to sunlight, patients should wear protective clothing, sunglasses, and sunscreen.  The patient was instructed to call the office immediately if the following severe adverse effects occur:  hearing changes, easy bruising/bleeding, severe headache, or vision changes.  The patient verbalized understanding of the proper use and possible adverse effects of minocycline.  All of the patient's questions and concerns were addressed.

## 2023-11-09 RX ORDER — AMOXICILLIN 500 MG/1
500 CAPSULE ORAL
Qty: 12 | Refills: 2 | Status: ACTIVE | COMMUNITY
Start: 2021-06-03 | End: 1900-01-01

## 2024-01-09 NOTE — PRE-OP CHECKLIST - WEIGHT IN LBS
Continued Stay Note  [facility]     Patient Name: Ludy Strong  MRN: [id number]  [facility].  Neelam/ Kenzie is following.  Referral to [facility] is pending in case pt is able to return home upon DC. .........Angelic DOBBS/ ARMIN                   Discharge Codes    No documentation.                 Expected Discharge Date and Time       Expected Discharge Date Expected Discharge Time    Travis 10, 2024               Angelic Caicedo RN     227.9

## 2024-01-19 NOTE — ASU PATIENT PROFILE, ADULT - NS TRANSFER PATIENT BELONGINGS
From: Jessy Olsen  To: Shoaib Curry  Sent: 1/18/2024 7:49 PM CST  Subject: Synechiae    Hello, do I have a high risk of miscarriage with this diagnosis? If so, what is the percentage? Is there a range of mild cases to more severe, and what would mine be considered?    Will synechiae affect my ability to have more children in the future?   Electronic Device (specify)

## 2024-12-04 NOTE — CONSULT NOTE ADULT - CONSULT REQUESTED DATE/TIME
HDS on arrival in ED. /70 (12/2 AM)     - C/w home antihypertensives given low concern for hypotension i/s/o sepsis. (Amlodipine 5 mg, Metoprolol 25 mg) 15-Ra-2022 10:35

## 2025-01-15 NOTE — ASU PATIENT PROFILE, ADULT - AS SC BRADEN SENSORY
Ramirez Ardon called to request a refill on his medication.      Last office visit : 8/2/2024   Next office visit : 2/7/2025     Last UDS:   Benzodiazepine Screen, Urine   Date Value Ref Range Status   04/03/2024 NEG  Final     Buprenorphine Urine   Date Value Ref Range Status   04/03/2024 NEG  Final     Cocaine Metabolite Screen, Urine   Date Value Ref Range Status   04/03/2024 NEG  Final     Gabapentin Screen, Urine   Date Value Ref Range Status   04/03/2024 NEG  Final     Oxycodone Screen, Ur   Date Value Ref Range Status   04/03/2024 NEG  Final     Propoxyphene Screen, Urine   Date Value Ref Range Status   04/03/2024 NEG  Final     THC Screen, Urine   Date Value Ref Range Status   04/03/2024 NEG  Final     Tricyclic Antidepressants, Urine   Date Value Ref Range Status   04/03/2024 NEG  Final       Last [place]: 1/15/2025  Medication Contract: 4/03/2024   Last Fill: 12/04/2024    Requested Prescriptions     Pending Prescriptions Disp Refills    testosterone cypionate (DEPOTESTOTERONE CYPIONATE) 200 MG/ML injection [Pharmacy Med Name: Testosterone Cypionate 200 MG/ML Intramuscular Solution] 2 mL 0     Sig: INJECT 1 ML I.M. ONCE EVERY 14 DAYS         Please approve or refuse this medication.   Smita Benson MA  
(4) no impairment

## 2025-07-14 ENCOUNTER — APPOINTMENT (OUTPATIENT)
Dept: CARDIOLOGY | Facility: CLINIC | Age: 80
End: 2025-07-14
Payer: MEDICARE

## 2025-07-14 VITALS
BODY MASS INDEX: 35.79 KG/M2 | WEIGHT: 228 LBS | RESPIRATION RATE: 16 BRPM | SYSTOLIC BLOOD PRESSURE: 126 MMHG | DIASTOLIC BLOOD PRESSURE: 77 MMHG | HEART RATE: 81 BPM | OXYGEN SATURATION: 99 % | HEIGHT: 67 IN | TEMPERATURE: 97.9 F

## 2025-07-14 PROCEDURE — 93000 ELECTROCARDIOGRAM COMPLETE: CPT

## 2025-07-14 PROCEDURE — 99203 OFFICE O/P NEW LOW 30 MIN: CPT

## 2025-07-14 PROCEDURE — 93306 TTE W/DOPPLER COMPLETE: CPT

## (undated) DEVICE — SUT MONOSOF 3-0 30" C-16

## (undated) DEVICE — PACK TOTAL KNEE

## (undated) DEVICE — DRSG 4X4

## (undated) DEVICE — SUT DERMABOND PRINEO 60CM

## (undated) DEVICE — SOL IRR POUR H2O 1500ML

## (undated) DEVICE — HOOD FLYTE STRYKER HELMET SHIELD

## (undated) DEVICE — SOLIDIFIER CANN EXPRESS 3K

## (undated) DEVICE — SEALER BIPOLAR 6.0 AQUAMANTYS

## (undated) DEVICE — BLANKET WARMER UPPER ADULT

## (undated) DEVICE — SOL IRR POUR NS 0.9% 500ML

## (undated) DEVICE — DRSG XEROFORM 1"

## (undated) DEVICE — IRRISEPT JET LAVAGE W 0.05 PCT CHG

## (undated) DEVICE — SUT MONOCRYL 3-0 18" PS-1

## (undated) DEVICE — KIT OPTIVAC CEMENT MIXER 40GM

## (undated) DEVICE — CONTAINER SPECIMEN PET

## (undated) DEVICE — ELCTR PENCIL NEPTUNE SMOKE EVACUATION

## (undated) DEVICE — DRSG COMBINE 5X9"

## (undated) DEVICE — SUT VICRYL PLUS 1 27" CP UNDYED

## (undated) DEVICE — SYM-STRYKER SYSTEM 7: Type: DURABLE MEDICAL EQUIPMENT

## (undated) DEVICE — CUFF TOURNIQUET 34" DUAL PORT W PLC

## (undated) DEVICE — SYR LUER LOK 20CC

## (undated) DEVICE — SYR LUER LOK 50CC

## (undated) DEVICE — GOWN XL W TOWEL

## (undated) DEVICE — SUT VICRYL PLUS 2-0 27" CP-1 UNDYED

## (undated) DEVICE — SOL IRR BAG NS 0.9% 3000ML

## (undated) DEVICE — NDL SPINAL 18G X 3.5"

## (undated) DEVICE — WRAP COMPRESSION CALF MED